# Patient Record
Sex: MALE | Race: BLACK OR AFRICAN AMERICAN | NOT HISPANIC OR LATINO | Employment: FULL TIME | URBAN - METROPOLITAN AREA
[De-identification: names, ages, dates, MRNs, and addresses within clinical notes are randomized per-mention and may not be internally consistent; named-entity substitution may affect disease eponyms.]

---

## 2023-10-11 ENCOUNTER — APPOINTMENT (EMERGENCY)
Dept: CT IMAGING | Facility: HOSPITAL | Age: 23
End: 2023-10-11
Payer: COMMERCIAL

## 2023-10-11 ENCOUNTER — HOSPITAL ENCOUNTER (EMERGENCY)
Facility: HOSPITAL | Age: 23
Discharge: HOME | End: 2023-10-11
Attending: EMERGENCY MEDICINE | Admitting: EMERGENCY MEDICINE
Payer: COMMERCIAL

## 2023-10-11 VITALS
OXYGEN SATURATION: 99 % | SYSTOLIC BLOOD PRESSURE: 139 MMHG | WEIGHT: 209.22 LBS | HEIGHT: 72 IN | RESPIRATION RATE: 18 BRPM | HEART RATE: 89 BPM | TEMPERATURE: 98 F | BODY MASS INDEX: 28.34 KG/M2 | DIASTOLIC BLOOD PRESSURE: 78 MMHG

## 2023-10-11 DIAGNOSIS — S39.012A ACUTE MYOFASCIAL STRAIN OF LUMBAR REGION, INITIAL ENCOUNTER: Primary | ICD-10-CM

## 2023-10-11 DIAGNOSIS — E87.6 HYPOKALEMIA: ICD-10-CM

## 2023-10-11 LAB
ALBUMIN SERPL BCP-MCNC: 4.4 G/DL (ref 3.5–5)
ALP SERPL-CCNC: 49 U/L (ref 34–104)
ALT SERPL W P-5'-P-CCNC: 11 U/L (ref 7–52)
ANION GAP SERPL CALCULATED.3IONS-SCNC: 6 MMOL/L
AST SERPL W P-5'-P-CCNC: 15 U/L (ref 13–39)
BASOPHILS # BLD AUTO: 0.02 THOUSANDS/ÂΜL (ref 0–0.1)
BASOPHILS NFR BLD AUTO: 1 % (ref 0–1)
BILIRUB SERPL-MCNC: 0.86 MG/DL (ref 0.2–1)
BILIRUB UR QL STRIP: NEGATIVE
BUN SERPL-MCNC: 16 MG/DL (ref 5–25)
CALCIUM SERPL-MCNC: 9.1 MG/DL (ref 8.4–10.2)
CHLORIDE SERPL-SCNC: 104 MMOL/L (ref 96–108)
CLARITY UR: CLEAR
CO2 SERPL-SCNC: 27 MMOL/L (ref 21–32)
COLOR UR: COLORLESS
CREAT SERPL-MCNC: 0.98 MG/DL (ref 0.6–1.3)
EOSINOPHIL # BLD AUTO: 0.04 THOUSAND/ÂΜL (ref 0–0.61)
EOSINOPHIL NFR BLD AUTO: 1 % (ref 0–6)
ERYTHROCYTE [DISTWIDTH] IN BLOOD BY AUTOMATED COUNT: 11.9 % (ref 11.6–15.1)
GFR SERPL CREATININE-BSD FRML MDRD: 108 ML/MIN/1.73SQ M
GLUCOSE SERPL-MCNC: 115 MG/DL (ref 65–140)
GLUCOSE UR STRIP-MCNC: NEGATIVE MG/DL
HCT VFR BLD AUTO: 38.2 % (ref 36.5–49.3)
HGB BLD-MCNC: 12.1 G/DL (ref 12–17)
HGB UR QL STRIP.AUTO: NEGATIVE
IMM GRANULOCYTES # BLD AUTO: 0.01 THOUSAND/UL (ref 0–0.2)
IMM GRANULOCYTES NFR BLD AUTO: 0 % (ref 0–2)
KETONES UR STRIP-MCNC: NEGATIVE MG/DL
LEUKOCYTE ESTERASE UR QL STRIP: NEGATIVE
LYMPHOCYTES # BLD AUTO: 0.96 THOUSANDS/ÂΜL (ref 0.6–4.47)
LYMPHOCYTES NFR BLD AUTO: 27 % (ref 14–44)
MCH RBC QN AUTO: 27.7 PG (ref 26.8–34.3)
MCHC RBC AUTO-ENTMCNC: 31.7 G/DL (ref 31.4–37.4)
MCV RBC AUTO: 87 FL (ref 82–98)
MONOCYTES # BLD AUTO: 0.24 THOUSAND/ÂΜL (ref 0.17–1.22)
MONOCYTES NFR BLD AUTO: 7 % (ref 4–12)
NEUTROPHILS # BLD AUTO: 2.26 THOUSANDS/ÂΜL (ref 1.85–7.62)
NEUTS SEG NFR BLD AUTO: 64 % (ref 43–75)
NITRITE UR QL STRIP: NEGATIVE
NRBC BLD AUTO-RTO: 0 /100 WBCS
PH UR STRIP.AUTO: 6 [PH]
PLATELET # BLD AUTO: 179 THOUSANDS/UL (ref 149–390)
PMV BLD AUTO: 9.4 FL (ref 8.9–12.7)
POTASSIUM SERPL-SCNC: 3.3 MMOL/L (ref 3.5–5.3)
PROT SERPL-MCNC: 6.7 G/DL (ref 6.4–8.4)
PROT UR STRIP-MCNC: NEGATIVE MG/DL
RBC # BLD AUTO: 4.37 MILLION/UL (ref 3.88–5.62)
SODIUM SERPL-SCNC: 137 MMOL/L (ref 135–147)
SP GR UR STRIP.AUTO: 1.01 (ref 1–1.03)
UROBILINOGEN UR STRIP-ACNC: <2 MG/DL
WBC # BLD AUTO: 3.53 THOUSAND/UL (ref 4.31–10.16)

## 2023-10-11 PROCEDURE — 81003 URINALYSIS AUTO W/O SCOPE: CPT | Performed by: EMERGENCY MEDICINE

## 2023-10-11 PROCEDURE — 80053 COMPREHEN METABOLIC PANEL: CPT | Performed by: EMERGENCY MEDICINE

## 2023-10-11 PROCEDURE — 99284 EMERGENCY DEPT VISIT MOD MDM: CPT | Performed by: EMERGENCY MEDICINE

## 2023-10-11 PROCEDURE — G1004 CDSM NDSC: HCPCS

## 2023-10-11 PROCEDURE — 74176 CT ABD & PELVIS W/O CONTRAST: CPT

## 2023-10-11 PROCEDURE — 36415 COLL VENOUS BLD VENIPUNCTURE: CPT | Performed by: EMERGENCY MEDICINE

## 2023-10-11 PROCEDURE — 99284 EMERGENCY DEPT VISIT MOD MDM: CPT

## 2023-10-11 PROCEDURE — 85025 COMPLETE CBC W/AUTO DIFF WBC: CPT | Performed by: EMERGENCY MEDICINE

## 2023-10-11 PROCEDURE — 96374 THER/PROPH/DIAG INJ IV PUSH: CPT

## 2023-10-11 RX ORDER — KETOROLAC TROMETHAMINE 30 MG/ML
15 INJECTION, SOLUTION INTRAMUSCULAR; INTRAVENOUS ONCE
Status: COMPLETED | OUTPATIENT
Start: 2023-10-11 | End: 2023-10-11

## 2023-10-11 RX ORDER — IBUPROFEN 600 MG/1
600 TABLET ORAL EVERY 8 HOURS PRN
Qty: 20 TABLET | Refills: 0 | Status: SHIPPED | OUTPATIENT
Start: 2023-10-11

## 2023-10-11 RX ORDER — CYCLOBENZAPRINE HCL 10 MG
10 TABLET ORAL
Qty: 7 TABLET | Refills: 0 | Status: SHIPPED | OUTPATIENT
Start: 2023-10-11

## 2023-10-11 RX ORDER — POTASSIUM CHLORIDE 20 MEQ/1
40 TABLET, EXTENDED RELEASE ORAL ONCE
Status: COMPLETED | OUTPATIENT
Start: 2023-10-11 | End: 2023-10-11

## 2023-10-11 RX ADMIN — KETOROLAC TROMETHAMINE 15 MG: 30 INJECTION INTRAMUSCULAR; INTRAVENOUS at 14:36

## 2023-10-11 RX ADMIN — POTASSIUM CHLORIDE 40 MEQ: 1500 TABLET, EXTENDED RELEASE ORAL at 15:27

## 2023-10-11 NOTE — DISCHARGE INSTRUCTIONS
Take ibuprofen 600 mg orally every 8 hours as needed for back discomfort. You may additionally take cyclobenzaprine 10 mg at night to help with muscle spasm. Heat may be applied for 15 to 20-minute intervals to ease soreness.

## 2023-10-11 NOTE — ED PROVIDER NOTES
History  Chief Complaint   Patient presents with    Flank Pain     Right sided flank pain that started Saturday. Denies n/v/fever. Denies difficulty urinating. Reports pain intermittently radiates to abdomen     Patient is a 60-year-old male who presents to the emergency department for evaluation with right flank pain. Onset a few days ago. He notes that this was mild initially. It has been fairly continuous however with radiation anteriorly. Pain is worst with movement such as walking. He does feel better when still. He has not appreciated any fevers, nausea, vomiting, change in appetite or changes or abnormalities in urination or defecation. Acetaminophen has helped the pain slightly. He is a  by [employer] and normally does not complain of discomfort. He is accustomed to working in unusual positions and does not recall having done anything out of the ordinary. His wife does accompany him to the ED and notes that he never complains of anything. He is healthy at baseline and without any known history of kidney stones. None       History reviewed. No pertinent past medical history. History reviewed. No pertinent surgical history. History reviewed. No pertinent family history. I have reviewed and agree with the history as documented. E-Cigarette/Vaping     E-Cigarette/Vaping Substances     Social History     Tobacco Use    Smoking status: Never    Smokeless tobacco: Never   Substance Use Topics    Alcohol use: Not Currently    Drug use: Never       Review of Systems   Genitourinary:  Negative for decreased urine volume, dysuria, frequency, hematuria and urgency. All other systems reviewed and are negative. Physical Exam  Physical Exam  Vitals and nursing note reviewed. Constitutional:       Appearance: Normal appearance. HENT:      Head: Normocephalic. Nose: Nose normal.   Eyes:      Extraocular Movements: Extraocular movements intact.       Conjunctiva/sclera: Conjunctivae normal.   Cardiovascular:      Rate and Rhythm: Normal rate and regular rhythm. Pulmonary:      Effort: Pulmonary effort is normal.      Breath sounds: Normal breath sounds. Abdominal:      General: Bowel sounds are normal. There is no distension. Palpations: Abdomen is soft. Tenderness: There is no abdominal tenderness. There is no right CVA tenderness, left CVA tenderness or guarding. Musculoskeletal:         General: Normal range of motion. Comments: No midline or paraspinal low thoracic, lumbar or sacral tenderness. Overlying skin is clear. Skin:     General: Skin is warm and dry. Findings: No rash. Neurological:      Mental Status: He is alert and oriented to person, place, and time.    Psychiatric:         Mood and Affect: Mood normal.         Behavior: Behavior normal.         Vital Signs  ED Triage Vitals [10/11/23 1351]   Temperature Pulse Respirations Blood Pressure SpO2   98 °F (36.7 °C) 89 18 139/78 99 %      Temp Source Heart Rate Source Patient Position - Orthostatic VS BP Location FiO2 (%)   Oral Monitor Sitting Right arm --      Pain Score       6           Vitals:    10/11/23 1351   BP: 139/78   Pulse: 89   Patient Position - Orthostatic VS: Sitting         Visual Acuity      ED Medications  Medications   ketorolac (TORADOL) injection 15 mg (15 mg Intravenous Given 10/11/23 1436)   potassium chloride (K-DUR,KLOR-CON) CR tablet 40 mEq (40 mEq Oral Given 10/11/23 1527)       Diagnostic Studies  Results Reviewed       Procedure Component Value Units Date/Time    UA (URINE) with reflex to Scope [[id number]] Collected: 10/11/23 1438    Lab Status: Final result Specimen: Urine, Clean Catch Updated: 10/11/23 1506     Color, UA Colorless     Clarity, UA Clear     Specific Gravity, UA 1.015     pH, UA 6.0     Leukocytes, UA Negative     Nitrite, UA Negative     Protein, UA Negative mg/dl      Glucose, UA Negative mg/dl      Ketones, UA Negative mg/dl      Urobilinogen, UA <2.0 mg/dl      Bilirubin, UA Negative     Occult Blood, UA Negative    Comprehensive metabolic panel [[id number]]  (Abnormal) Collected: 10/11/23 1417    Lab Status: Final result Specimen: Blood from Arm, Left Updated: 10/11/23 1442     Sodium 137 mmol/L      Potassium 3.3 mmol/L      Chloride 104 mmol/L      CO2 27 mmol/L      ANION GAP 6 mmol/L      BUN 16 mg/dL      Creatinine 0.98 mg/dL      Glucose 115 mg/dL      Calcium 9.1 mg/dL      AST 15 U/L      ALT 11 U/L      Alkaline Phosphatase 49 U/L      Total Protein 6.7 g/dL      Albumin 4.4 g/dL      Total Bilirubin 0.86 mg/dL      eGFR 108 ml/min/1.73sq m     Narrative:      National Kidney Disease Foundation guidelines for Chronic Kidney Disease (CKD):     Stage 1 with normal or high GFR (GFR > 90 mL/min/1.73 square meters)    Stage 2 Mild CKD (GFR = 60-89 mL/min/1.73 square meters)    Stage 3A Moderate CKD (GFR = 45-59 mL/min/1.73 square meters)    Stage 3B Moderate CKD (GFR = 30-44 mL/min/1.73 square meters)    Stage 4 Severe CKD (GFR = 15-29 mL/min/1.73 square meters)    Stage 5 End Stage CKD (GFR <15 mL/min/1.73 square meters)  Note: GFR calculation is accurate only with a steady state creatinine    CBC and differential [[id number]]  (Abnormal) Collected: 10/11/23 1417    Lab Status: Final result Specimen: Blood from Arm, Left Updated: 10/11/23 1429     WBC 3.53 Thousand/uL      RBC 4.37 Million/uL      Hemoglobin 12.1 g/dL      Hematocrit 38.2 %      MCV 87 fL      MCH 27.7 pg      MCHC 31.7 g/dL      RDW 11.9 %      MPV 9.4 fL      Platelets 813 Thousands/uL      nRBC 0 /100 WBCs      Neutrophils Relative 64 %      Immat GRANS % 0 %      Lymphocytes Relative 27 %      Monocytes Relative 7 %      Eosinophils Relative 1 %      Basophils Relative 1 %      Neutrophils Absolute 2.26 Thousands/µL      Immature Grans Absolute 0.01 Thousand/uL      Lymphocytes Absolute 0.96 Thousands/µL      Monocytes Absolute 0.24 Thousand/µL      Eosinophils Absolute 0.04 Thousand/µL Basophils Absolute 0.02 Thousands/µL                    CT renal stone study abdomen pelvis without contrast   Final Result by Nataly Tan MD (10/11 1438)      No urinary tract calculi, hydronephrosis or additional acute abnormality in the abdomen or pelvis. Workstation performed: [id number]                    Procedures  Procedures         ED Course  ED Course as of 10/11/23 1534   Wed Oct 11, 2023   1403 Differential diagnosis includes but is not limited to muscle strain, lumbar radiculopathy, muscle spasm, ureterolithiasis, doubt pyelonephritis or bowel pathology. 1444 CMP reveals normal renal function/GFR. Potassium is slightly low. Will give dose of oral potassium for repletion. CBC unremarkable. CT does not reveal structural abnormality including ureterolithiasis or hydronephrosis. Urinalysis has been collected. Results pending. 1518 Patient appreciates improvement in discomfort at this time. I reviewed study results with him and his wife. Do suspect musculoskeletal etiology. Discussed use of anti-inflammatory +/- muscle relaxer. He is interested in having something to use in the evening time when he has difficulty sleeping. Discussed comprehensive spine program.  Patient declines at this time and is interested in following up with his PCP. Discussed repletion of potassium and that this low level was noncontributory to his symptoms. Medical Decision Making  Problems Addressed:  Acute myofascial strain of lumbar region, initial encounter: acute illness or injury    Amount and/or Complexity of Data Reviewed  Labs: ordered. Decision-making details documented in ED Course. Radiology: ordered. Risk  OTC drugs. Prescription drug management.              Disposition  Final diagnoses:   Acute myofascial strain of lumbar region, initial encounter   Hypokalemia     Time reflects when diagnosis was documented in both MDM as applicable and the Disposition within this note       Time User Action Codes Description Comment    10/11/2023  3:21 PM Wade SPRAGUE Add [S39.012A] Acute myofascial strain of lumbar region, initial encounter     10/11/2023  3:23 PM Wade SPRAGUE Add [E87.6] Hypokalemia           ED Disposition       ED Disposition   Discharge    Condition   Stable    Date/Time   Wed Oct 11, 2023  3:21 PM    Robina Young [facility] discharge to home/self care. Follow-up Information       Follow up With Specialties Details Why Contact Info    Your primary care physician                Discharge Medication List as of 10/11/2023  3:24 PM        START taking these medications    Details   cyclobenzaprine (FLEXERIL) 10 mg tablet Take 1 tablet (10 mg total) by mouth daily at bedtime as needed for muscle spasms, Starting Wed 10/11/2023, Normal      ibuprofen (MOTRIN) 600 mg tablet Take 1 tablet (600 mg total) by mouth every 8 (eight) hours as needed for moderate pain, Starting Wed 10/11/2023, Normal             No discharge procedures on file.     PDMP Review       None            ED Provider  Electronically Signed by             Db Beltran MD  10/11/23 7418

## 2024-05-30 ENCOUNTER — HOSPITAL ENCOUNTER (OUTPATIENT)
Dept: RADIOLOGY | Facility: HOSPITAL | Age: 24
End: 2024-05-30
Payer: COMMERCIAL

## 2024-05-30 ENCOUNTER — OFFICE VISIT (OUTPATIENT)
Dept: OBGYN CLINIC | Facility: CLINIC | Age: 24
End: 2024-05-30
Payer: COMMERCIAL

## 2024-05-30 VITALS — HEART RATE: 89 BPM | HEIGHT: 72 IN | OXYGEN SATURATION: 99 % | BODY MASS INDEX: 26.68 KG/M2 | WEIGHT: 197 LBS

## 2024-05-30 DIAGNOSIS — M25.511 RIGHT SHOULDER PAIN, UNSPECIFIED CHRONICITY: ICD-10-CM

## 2024-05-30 DIAGNOSIS — M25.511 RIGHT SHOULDER PAIN, UNSPECIFIED CHRONICITY: Primary | ICD-10-CM

## 2024-05-30 DIAGNOSIS — M25.311 INSTABILITY OF RIGHT SHOULDER JOINT: ICD-10-CM

## 2024-05-30 PROCEDURE — 73030 X-RAY EXAM OF SHOULDER: CPT

## 2024-05-30 PROCEDURE — 99204 OFFICE O/P NEW MOD 45 MIN: CPT | Performed by: PHYSICIAN ASSISTANT

## 2024-05-30 NOTE — PROGRESS NOTES
Assessment & Plan   Diagnoses and all orders for this visit:    Right shoulder pain, unspecified chronicity    Instability of right shoulder joint    Recurrent dislocations, ?inferior    - MRI arthrogram  - Ice as needed  - Avoid the positions that caused dislocations in the past  - Follow up with Dr. Floyd          Subjective   Patient ID: Destin Melo is a 24 y.o. male.    Vitals:    05/30/24 1629   Pulse: 89   SpO2: 99%     .  23yo male comes in for an evaluation of his right shoulder.  He has a history of numerous subluxations and can do this at will.  He had his first full dislocation when throwing a tennis ball a few months ago.  He reports his arm was stuck in a fully extended, [place] position and it was eventually reduced by his wife.  Then, on 5/25 he dove into a pool with his arms up in a dive position and the shoulder dislocated again.  He and his wife were able to reduce it by pulling on it. A few minutes later, he was swimming underwater and it dislocated again.  This time, they had much more difficulty reducing it.  The pain is dull in character, mild in severity, does not radiate and is not associated with numbness.  He has never been diagnosed with hypermobility or Nicholas-Danlos, but states he is double jointed.  He is able to dislocate the left shoulder at will also.        The following portions of the patient's history were reviewed and updated as appropriate: allergies, current medications, past family history, past medical history, past social history, past surgical history, and problem list.    Review of Systems  Ortho Exam  History reviewed. No pertinent past medical history.  History reviewed. No pertinent surgical history.  History reviewed. No pertinent family history.  Social History     Occupational History    Not on file   Tobacco Use    Smoking status: Never    Smokeless tobacco: Never   Substance and Sexual Activity    Alcohol use: Not Currently    Drug use: Never     Sexual activity: Not on file       Review of Systems   Constitutional: Negative.    HENT: Negative.    Eyes: Negative.    Respiratory: Negative.    Cardiovascular: Negative.    Gastrointestinal: Negative.    Endocrine: Negative.    Genitourinary: Negative.    Musculoskeletal: As below..   Allergic/Immunologic: Negative.    Neurological: Negative.    Hematological: Negative.    Psychiatric/Behavioral: Negative.        Objective   Physical Exam    Xrays:  I have personally reviewed pertinent films in PACS and my interpretation is No acute displaced fracture. No hill sachs..    Constitutional: Awake, Alert, Oriented  Eyes: EOMI  Psych: Mood and affect appropriate  Heart: regular rate   Lungs: No audible wheezing  Abdomen: No guarding  Lymph: no lymphedema      right Shoulder:  Appearance  No swelling, discoloration, deformity, or ecchymosis  Palpation  No tenderness to palpation of the clavicle, AC joint, biceps tendon, scapula, or proximal humerus  Mild GH tenderness  ROM  Flexion: 160, ER: 80, and IR: L2.  Pain at the ends of range in all planes  Motor  Internal and external rotation 5/5 with shoulder at side, flexion 5/5  Special tests  + apprehension test  NVI distally

## 2024-06-10 NOTE — NURSING NOTE
Call placed to patient to discuss upcoming appointment at [facility] radiology department and complete consultation with patient. Patient is having a right shoulder MRI arthrogram utilizing fluoroscopy guidance. Reviewed patient's allergies, no current anticoagulant medication present per patient, also discussed the pre and post procedure expectations. Patient made aware of need for  post procedure if anti anxiety medication is taken. Reminded patient of location and time expected for procedure, Patient expressed understanding by verbalizing and repeating instructions. Patient requested information to be e-mail to him due to his being at work. Sent information to [email or web address], see message below.   Good morning Mr. Melo,     We are located at [address] [place] [address]. Your appointment is scheduled for 6/17/24 @ 2 pm you are scheduled to have a right shoulder MRI arthrogram . You will need to enter [address], come up to the 1st floor and locate the Radiology department. Registration is in the Radiology department, please arrive 15 minutes prior so you may go through the registration process. You will have your Fluoro guided procedure then be guided to our MRI suite for your images @ 3 pm. For this test there are no restrictions, you may eat, drink, take all your usual medications and drive yourself to and from the appointment if you are not taking any medication for anti-anxiety for the procedure. If you are taking medications for anxiety for the procedure you will need a .      If you have any question or concerns, please feel free to contact me at the number below,   Marlin Bond RN  [facility] Radiology RN  [address] [place] [id number] [phone] (Office)  [phone] (Fax)  [email or web address]

## 2024-06-17 ENCOUNTER — HOSPITAL ENCOUNTER (OUTPATIENT)
Dept: RADIOLOGY | Facility: HOSPITAL | Age: 24
Discharge: HOME | End: 2024-06-17
Payer: COMMERCIAL

## 2024-06-17 DIAGNOSIS — M25.511 RIGHT SHOULDER PAIN, UNSPECIFIED CHRONICITY: ICD-10-CM

## 2024-06-17 DIAGNOSIS — S00.259A METAL FOREIGN BODY IN EYE REGION: ICD-10-CM

## 2024-06-17 DIAGNOSIS — M25.311 INSTABILITY OF RIGHT SHOULDER JOINT: ICD-10-CM

## 2024-06-17 PROCEDURE — A9585 GADOBUTROL INJECTION: HCPCS | Performed by: PHYSICIAN ASSISTANT

## 2024-06-17 PROCEDURE — 73222 MRI JOINT UPR EXTREM W/DYE: CPT

## 2024-06-17 PROCEDURE — 77002 NEEDLE LOCALIZATION BY XRAY: CPT

## 2024-06-17 PROCEDURE — 23350 INJECTION FOR SHOULDER X-RAY: CPT

## 2024-06-17 RX ORDER — LIDOCAINE HYDROCHLORIDE 10 MG/ML
5 INJECTION, SOLUTION EPIDURAL; INFILTRATION; INTRACAUDAL; PERINEURAL
Status: COMPLETED | OUTPATIENT
Start: 2024-06-17 | End: 2024-06-17

## 2024-06-17 RX ORDER — GADOBUTROL 604.72 MG/ML
2 INJECTION INTRAVENOUS
Status: COMPLETED | OUTPATIENT
Start: 2024-06-17 | End: 2024-06-17

## 2024-06-17 RX ADMIN — GADOBUTROL 0.2 ML: 604.72 INJECTION INTRAVENOUS at 15:41

## 2024-06-17 RX ADMIN — LIDOCAINE HYDROCHLORIDE 4 ML: 10 INJECTION, SOLUTION EPIDURAL; INFILTRATION; INTRACAUDAL; PERINEURAL at 15:44

## 2024-06-17 RX ADMIN — IOHEXOL 1 ML: 300 INJECTION, SOLUTION INTRAVENOUS at 15:22

## 2024-07-03 ENCOUNTER — OFFICE VISIT (OUTPATIENT)
Dept: OBGYN CLINIC | Facility: CLINIC | Age: 24
End: 2024-07-03
Payer: COMMERCIAL

## 2024-07-03 VITALS
BODY MASS INDEX: 27.28 KG/M2 | HEIGHT: 72 IN | DIASTOLIC BLOOD PRESSURE: 72 MMHG | WEIGHT: 201.4 LBS | SYSTOLIC BLOOD PRESSURE: 136 MMHG | HEART RATE: 63 BPM

## 2024-07-03 DIAGNOSIS — M25.311 INSTABILITY OF RIGHT SHOULDER JOINT: ICD-10-CM

## 2024-07-03 DIAGNOSIS — S43.004A DISLOCATION OF RIGHT SHOULDER JOINT, INITIAL ENCOUNTER: Primary | ICD-10-CM

## 2024-07-03 PROCEDURE — 99214 OFFICE O/P EST MOD 30 MIN: CPT | Performed by: ORTHOPAEDIC SURGERY

## 2024-07-03 RX ORDER — CHLORHEXIDINE GLUCONATE 40 MG/ML
SOLUTION TOPICAL DAILY PRN
OUTPATIENT
Start: 2024-07-03

## 2024-07-03 RX ORDER — CHLORHEXIDINE GLUCONATE ORAL RINSE 1.2 MG/ML
15 SOLUTION DENTAL ONCE
OUTPATIENT
Start: 2024-07-03 | End: 2024-07-03

## 2024-07-03 NOTE — H&P (VIEW-ONLY)
[facility] [address] [id number] [phone]       Destin Sims [facility]  [id number]  2000    ORTHOPAEDIC SURGERY OUTPATIENT NOTE  7/3/2024      HISTORY:  24 y.o. male presents for initial evaluation of his right shoulder.  He reports in May of this year he was playing tennis and swinging overhead with a racquet when he felt his shoulder dislocate.  He had 2 subsequent dislocations while swimming with reaching overhead and diving position.  He did self reduce this shoulder.  He reports a history of bilateral shoulders being able to sublux but not fully dislocate.  He is never had a traumatic dislocation before.  He reports his pain is improved in the shoulder since the initial injury but he continues with feeling of instability when he reaches overhead.  He denies any pain in the left shoulder.  He has no other hypermobile joints are laxity.    History reviewed. No pertinent past medical history.    Past Surgical History:   Procedure Laterality Date    FL INJECTION RIGHT SHOULDER (ARTHROGRAM)  6/17/2024       Social History     Socioeconomic History    Marital status: /Civil Union     Spouse name: Not on file    Number of children: Not on file    Years of education: Not on file    Highest education level: Not on file   Occupational History    Not on file   Tobacco Use    Smoking status: Never    Smokeless tobacco: Never   Substance and Sexual Activity    Alcohol use: Not Currently    Drug use: Never    Sexual activity: Not on file   Other Topics Concern    Not on file   Social History Narrative    Not on file     Social Determinants of Health     Financial Resource Strain: Not on file   Food Insecurity: Not on file   Transportation Needs: Not on file   Physical Activity: Not on file   Stress: Not on file   Social Connections: Unknown (6/18/2024)    Received from [email or web address]     How often  do you feel lonely or isolated from those around you? (Adult - for ages 18 years and over): Not on file   Intimate Partner Violence: Not on file   Housing Stability: Not on file       History reviewed. No pertinent family history.     Patient's Medications   New Prescriptions    No medications on file   Previous Medications    CYCLOBENZAPRINE (FLEXERIL) 10 MG TABLET    Take 1 tablet (10 mg total) by mouth daily at bedtime as needed for muscle spasms    IBUPROFEN (MOTRIN) 600 MG TABLET    Take 1 tablet (600 mg total) by mouth every 8 (eight) hours as needed for moderate pain   Modified Medications    No medications on file   Discontinued Medications    No medications on file       No Known Allergies     /72 (BP Location: Left arm, Patient Position: Sitting, Cuff Size: Standard)   Pulse 63   Ht 6' (1.829 m)   Wt 91.4 kg (201 lb 6.4 oz)   BMI 27.31 kg/m²      REVIEW OF SYSTEMS:  Constitutional: Negative.    HEENT: Negative.    Respiratory: Negative.    Skin: Negative.    Neurological: Negative.    Psychiatric/Behavioral: Negative.  Musculoskeletal: Negative except for that mentioned in the HPI.    /72 (BP Location: Left arm, Patient Position: Sitting, Cuff Size: Standard)   Pulse 63   Ht 6' (1.829 m)   Wt 91.4 kg (201 lb 6.4 oz)   BMI 27.31 kg/m²   Gen: No acute distress, resting comfortably in bed  HEENT: Eyes clear, moist mucus membranes, hearing intact  Respiratory: No audible wheezing or stridor  Cardiovascular: Well Perfused peripherally, 2+ distal pulse  Abdomen: nondistended, no peritoneal signs     PHYSICAL EXAM:    RIGHT SHOULDER:    Appearance: normal    Forward flexion:   180 degrees   Abduction:  90 degrees   External rotation at 90 degrees abduction:   90 degrees   Internal rotation at 90 degrees abduction:  90 degrees   External rotation at 0 degrees:   70 degrees   Internal rotation: T7     STRENGTH:  Forward flexion:  5/5   Abduction:  5/5   External rotation:  5/5   Internal  rotation:  5/5        Speed test: Negative  Yergason's: Negative   Tender to palpation ACJ (acromioclavicular joint): Negative   Tender to palpation LHB (long head of biceps): Negative  Krishna test: Negative  [place] test: Negative  Hornblower's: Negative  Lift off: Negative  Belly press: Negative  Bear hug: Negative  External lag sign: Negative  Cross-body adduction: Negative  Sulcus sign: Negative  Arcenio's test: Negative  Drop arm test: negative  Apprehension: positive    Radial/median/ulnar nerve intact    <2 sec cap refill       IMAGING: MRI arthrogram of the shoulder was reviewed.  This demonstrates anterior labral tear, Hill-Sachs lesion with marrow edema    ASSESSMENT AND PLAN:  24 y.o. male with right shoulder dislocation and anterior glenoid labral tear and Hill-Sachs lesion.  Discussed with the patient his imaging and exam findings.  This case was also discussed with Dr. Floyd.  Given the patient's instability and significant findings on MRI recommend surgical invention in the form of right shoulder arthroscopy with labral repair and possible remplissage.  Informed consent was signed.  We also ordered a CT scan preoperatively to evaluate for bony defect.    The patient understands the risks and benefits of the procedure with risks including pain, stiffness, infection, neurovascular injury, recurrence of symptoms, failure of surgical procedure, inadvertent intraoperative complications, blood loss, blood clots, allergic reaction to anesthesia, stroke, heart attack, all up to and including to death. The patient understood and did consent for surgery today.

## 2024-07-03 NOTE — PROGRESS NOTES
[facility]TH[facility]ARE [facility] [address] [id number] [phone]       Destin Sims [facility]  [id number]  2000    ORTHOPAEDIC SURGERY OUTPATIENT NOTE  7/3/2024      HISTORY:  24 y.o. male presents for initial evaluation of his right shoulder.  He reports in May of this year he was playing tennis and swinging overhead with a racquet when he felt his shoulder dislocate.  He had 2 subsequent dislocations while swimming with reaching overhead and diving position.  He did self reduce this shoulder.  He reports a history of bilateral shoulders being able to sublux but not fully dislocate.  He is never had a traumatic dislocation before.  He reports his pain is improved in the shoulder since the initial injury but he continues with feeling of instability when he reaches overhead.  He denies any pain in the left shoulder.  He has no other hypermobile joints are laxity.    History reviewed. No pertinent past medical history.    Past Surgical History:   Procedure Laterality Date    FL INJECTION RIGHT SHOULDER (ARTHROGRAM)  6/17/2024       Social History     Socioeconomic History    Marital status: /Civil Union     Spouse name: Not on file    Number of children: Not on file    Years of education: Not on file    Highest education level: Not on file   Occupational History    Not on file   Tobacco Use    Smoking status: Never    Smokeless tobacco: Never   Substance and Sexual Activity    Alcohol use: Not Currently    Drug use: Never    Sexual activity: Not on file   Other Topics Concern    Not on file   Social History Narrative    Not on file     Social Determinants of Health     Financial Resource Strain: Not on file   Food Insecurity: Not on file   Transportation Needs: Not on file   Physical Activity: Not on file   Stress: Not on file   Social Connections: Unknown (6/18/2024)    Received from [employer]     How often  do you feel lonely or isolated from those around you? (Adult - for ages 18 years and over): Not on file   Intimate Partner Violence: Not on file   Housing Stability: Not on file       History reviewed. No pertinent family history.     Patient's Medications   New Prescriptions    No medications on file   Previous Medications    CYCLOBENZAPRINE (FLEXERIL) 10 MG TABLET    Take 1 tablet (10 mg total) by mouth daily at bedtime as needed for muscle spasms    IBUPROFEN (MOTRIN) 600 MG TABLET    Take 1 tablet (600 mg total) by mouth every 8 (eight) hours as needed for moderate pain   Modified Medications    No medications on file   Discontinued Medications    No medications on file       No Known Allergies     /72 (BP Location: Left arm, Patient Position: Sitting, Cuff Size: Standard)   Pulse 63   Ht 6' (1.829 m)   Wt 91.4 kg (201 lb 6.4 oz)   BMI 27.31 kg/m²      REVIEW OF SYSTEMS:  Constitutional: Negative.    HEENT: Negative.    Respiratory: Negative.    Skin: Negative.    Neurological: Negative.    Psychiatric/Behavioral: Negative.  Musculoskeletal: Negative except for that mentioned in the HPI.    /72 (BP Location: Left arm, Patient Position: Sitting, Cuff Size: Standard)   Pulse 63   Ht 6' (1.829 m)   Wt 91.4 kg (201 lb 6.4 oz)   BMI 27.31 kg/m²   Gen: No acute distress, resting comfortably in bed  HEENT: Eyes clear, moist mucus membranes, hearing intact  Respiratory: No audible wheezing or stridor  Cardiovascular: Well Perfused peripherally, 2+ distal pulse  Abdomen: nondistended, no peritoneal signs     PHYSICAL EXAM:    RIGHT SHOULDER:    Appearance: normal    Forward flexion:   180 degrees   Abduction:  90 degrees   External rotation at 90 degrees abduction:   90 degrees   Internal rotation at 90 degrees abduction:  90 degrees   External rotation at 0 degrees:   70 degrees   Internal rotation: T7     STRENGTH:  Forward flexion:  5/5   Abduction:  5/5   External rotation:  5/5   Internal  rotation:  5/5        Speed test: Negative  Yergason's: Negative   Tender to palpation ACJ (acromioclavicular joint): Negative   Tender to palpation LHB (long head of biceps): Negative  Krishna test: Negative  [place] test: Negative  Hornblower's: Negative  Lift off: Negative  Belly press: Negative  Bear hug: Negative  External lag sign: Negative  Cross-body adduction: Negative  Sulcus sign: Negative  Arcenio's test: Negative  Drop arm test: negative  Apprehension: positive    Radial/median/ulnar nerve intact    <2 sec cap refill       IMAGING: MRI arthrogram of the shoulder was reviewed.  This demonstrates anterior labral tear, Hill-Sachs lesion with marrow edema    ASSESSMENT AND PLAN:  24 y.o. male with right shoulder dislocation and anterior glenoid labral tear and Hill-Sachs lesion.  Discussed with the patient his imaging and exam findings.  This case was also discussed with Dr. Floyd.  Given the patient's instability and significant findings on MRI recommend surgical invention in the form of right shoulder arthroscopy with labral repair and possible remplissage.  Informed consent was signed.  We also ordered a CT scan preoperatively to evaluate for bony defect.    The patient understands the risks and benefits of the procedure with risks including pain, stiffness, infection, neurovascular injury, recurrence of symptoms, failure of surgical procedure, inadvertent intraoperative complications, blood loss, blood clots, allergic reaction to anesthesia, stroke, heart attack, all up to and including to death. The patient understood and did consent for surgery today.

## 2024-07-03 NOTE — LETTER
July 3, 2024     Patient: Destin Melo  YOB: 2000  Date of Visit: 7/3/2024      To Whom it May Concern:    Destin Melo is under my professional care. Destin was seen in my office on 7/3/2024. Destin may return to work light duty, no lifting over 5lb no overhead work. He will be scheduled for surgery and will need to be out of work at least 6 weeks after surgery.     If you have any questions or concerns, please don't hesitate to call.         Sincerely,          Lianna Floyd DO        CC: No Recipients

## 2024-07-08 ENCOUNTER — TELEPHONE (OUTPATIENT)
Dept: OBGYN CLINIC | Facility: CLINIC | Age: 24
End: 2024-07-08

## 2024-07-08 ENCOUNTER — TELEPHONE (OUTPATIENT)
Dept: OBGYN CLINIC | Facility: HOSPITAL | Age: 24
End: 2024-07-08

## 2024-07-08 NOTE — TELEPHONE ENCOUNTER
Caller: Charlette     Doctor: Mariel    Reason for call: Wife calling and will send disability forms through [employer] for Destin. I walked her through on how to send it through [employer].   Advised takes 10-14 days to complete.     Just wanted to give a heads up.     Call back#: [phone]

## 2024-07-08 NOTE — TELEPHONE ENCOUNTER
Returned call to pt's wife per message received.  They're requesting to change his surgery date to after 7/21.  New surgery and p/o dates agreed upon.

## 2024-07-11 ENCOUNTER — HOSPITAL ENCOUNTER (OUTPATIENT)
Dept: CT IMAGING | Facility: HOSPITAL | Age: 24
Discharge: HOME | End: 2024-07-11
Payer: COMMERCIAL

## 2024-07-11 DIAGNOSIS — S43.004A DISLOCATION OF RIGHT SHOULDER JOINT, INITIAL ENCOUNTER: ICD-10-CM

## 2024-07-11 PROCEDURE — 73200 CT UPPER EXTREMITY W/O DYE: CPT

## 2024-07-15 NOTE — PRE-PROCEDURE INSTRUCTIONS
No outpatient medications have been marked as taking for the 7/25/24 encounter (Hospital Encounter).     Spoke with pt via phone.    Medication instructions for day surgery reviewed. Please use only a sip of water to take your instructed medications. Avoid all over the counter vitamins, supplements and NSAIDS for one week prior to surgery per anesthesia guidelines. Tylenol is ok to take as needed.     You will receive a call one business day prior to surgery with an arrival time and hospital directions. If your surgery is scheduled on a Monday, the hospital will be calling you on the Friday prior to your surgery. If you have not heard from anyone by 8pm, please call the hospital supervisor through the hospital  at [phone]. ([place] [phone] or [place] [phone]).    Do not eat or drink anything after midnight the night before your surgery, including candy, mints, lifesavers, or chewing gum. Do not drink alcohol 24hrs before your surgery. Try not to smoke at least 24hrs before your surgery.       Follow the pre surgery showering instructions as listed in the “My Surgical Experience Booklet” or otherwise provided by your surgeon's office. Do not use a blade to shave the surgical area 1 week before surgery. It is okay to use a clean electric clippers up to 24 hours before surgery. Do not apply any lotions, creams, including makeup, cologne, deodorant, or perfumes after showering on the day of your surgery. Do not use dry shampoo, hair spray, hair gel, or any type of hair products.     No contact lenses, eye make-up, or artificial eyelashes. Remove nail polish, including gel polish, and any artificial, gel, or acrylic nails if possible. Remove all jewelry including rings and body piercing jewelry.     Wear causal clothing that is easy to take on and off. Consider your type of surgery.    Keep any valuables, jewelry, piercings at home. Please bring any specially ordered equipment (sling, braces) if  indicated.    Arrange for a responsible person to drive you to and from the hospital on the day of your surgery. Please confirm the visitor policy for the day of your procedure when you receive your phone call with an arrival time.     Call the surgeon's office with any new illnesses, exposures, or additional questions prior to surgery.    Please reference your “My Surgical Experience Booklet” for additional information to prepare for your upcoming surgery.

## 2024-07-23 ENCOUNTER — TELEPHONE (OUTPATIENT)
Dept: OBGYN CLINIC | Facility: CLINIC | Age: 24
End: 2024-07-23

## 2024-07-23 NOTE — TELEPHONE ENCOUNTER
S/w pt to move his surgery from 7/25 to 7/26 per Dr. Floyd's request.  Pt verbalized understanding and agreed to the change.

## 2024-07-26 ENCOUNTER — ANESTHESIA EVENT (OUTPATIENT)
Dept: PERIOP | Facility: HOSPITAL | Age: 24
End: 2024-07-26
Payer: COMMERCIAL

## 2024-07-26 ENCOUNTER — HOSPITAL ENCOUNTER (OUTPATIENT)
Facility: HOSPITAL | Age: 24
Setting detail: OUTPATIENT SURGERY
Discharge: HOME | End: 2024-07-26
Attending: ORTHOPAEDIC SURGERY | Admitting: ORTHOPAEDIC SURGERY
Payer: COMMERCIAL

## 2024-07-26 ENCOUNTER — ANESTHESIA (OUTPATIENT)
Dept: PERIOP | Facility: HOSPITAL | Age: 24
End: 2024-07-26
Payer: COMMERCIAL

## 2024-07-26 VITALS
OXYGEN SATURATION: 98 % | TEMPERATURE: 97.5 F | HEART RATE: 68 BPM | SYSTOLIC BLOOD PRESSURE: 125 MMHG | RESPIRATION RATE: 16 BRPM | BODY MASS INDEX: 25.41 KG/M2 | HEIGHT: 72 IN | WEIGHT: 187.6 LBS | DIASTOLIC BLOOD PRESSURE: 68 MMHG

## 2024-07-26 DIAGNOSIS — S43.004A DISLOCATION OF RIGHT SHOULDER JOINT, INITIAL ENCOUNTER: Primary | ICD-10-CM

## 2024-07-26 PROCEDURE — 29806 SHO ARTHRS SRG CAPSULORRAPHY: CPT | Performed by: PHYSICIAN ASSISTANT

## 2024-07-26 PROCEDURE — C9290 INJ, BUPIVACAINE LIPOSOME: HCPCS | Performed by: STUDENT IN AN ORGANIZED HEALTH CARE EDUCATION/TRAINING PROGRAM

## 2024-07-26 PROCEDURE — 29806 SHO ARTHRS SRG CAPSULORRAPHY: CPT | Performed by: ORTHOPAEDIC SURGERY

## 2024-07-26 PROCEDURE — C1713 ANCHOR/SCREW BN/BN,TIS/BN: HCPCS | Performed by: ORTHOPAEDIC SURGERY

## 2024-07-26 DEVICE — SELF BUNCHING KL 1.8 FIBERTAK, SHOULDER
Type: IMPLANTABLE DEVICE | Site: SHOULDER | Status: FUNCTIONAL
Brand: ARTHREX®

## 2024-07-26 RX ORDER — SODIUM CHLORIDE, SODIUM LACTATE, POTASSIUM CHLORIDE, CALCIUM CHLORIDE 600; 310; 30; 20 MG/100ML; MG/100ML; MG/100ML; MG/100ML
125 INJECTION, SOLUTION INTRAVENOUS CONTINUOUS
Status: DISCONTINUED | OUTPATIENT
Start: 2024-07-26 | End: 2024-07-26 | Stop reason: HOSPADM

## 2024-07-26 RX ORDER — PENICILLIN V POTASSIUM 500 MG/1
500 TABLET ORAL EVERY 6 HOURS SCHEDULED
Qty: 8 TABLET | Refills: 0 | Status: SHIPPED | OUTPATIENT
Start: 2024-07-26 | End: 2024-07-28

## 2024-07-26 RX ORDER — FENTANYL CITRATE 50 UG/ML
INJECTION, SOLUTION INTRAMUSCULAR; INTRAVENOUS AS NEEDED
Status: DISCONTINUED | OUTPATIENT
Start: 2024-07-26 | End: 2024-07-26

## 2024-07-26 RX ORDER — CHLORHEXIDINE GLUCONATE ORAL RINSE 1.2 MG/ML
15 SOLUTION DENTAL ONCE
Status: COMPLETED | OUTPATIENT
Start: 2024-07-26 | End: 2024-07-26

## 2024-07-26 RX ORDER — ROCURONIUM BROMIDE 10 MG/ML
INJECTION, SOLUTION INTRAVENOUS AS NEEDED
Status: DISCONTINUED | OUTPATIENT
Start: 2024-07-26 | End: 2024-07-26

## 2024-07-26 RX ORDER — TRANEXAMIC ACID 10 MG/ML
1000 INJECTION, SOLUTION INTRAVENOUS ONCE
Status: COMPLETED | OUTPATIENT
Start: 2024-07-26 | End: 2024-07-26

## 2024-07-26 RX ORDER — FENTANYL CITRATE/PF 50 MCG/ML
50 SYRINGE (ML) INJECTION
Status: DISCONTINUED | OUTPATIENT
Start: 2024-07-26 | End: 2024-07-26 | Stop reason: HOSPADM

## 2024-07-26 RX ORDER — LIDOCAINE HYDROCHLORIDE 20 MG/ML
INJECTION, SOLUTION EPIDURAL; INFILTRATION; INTRACAUDAL; PERINEURAL AS NEEDED
Status: DISCONTINUED | OUTPATIENT
Start: 2024-07-26 | End: 2024-07-26

## 2024-07-26 RX ORDER — OXYCODONE HYDROCHLORIDE 5 MG/1
5 TABLET ORAL EVERY 4 HOURS PRN
Qty: 30 TABLET | Refills: 0 | Status: SHIPPED | OUTPATIENT
Start: 2024-07-26 | End: 2024-07-31

## 2024-07-26 RX ORDER — MIDAZOLAM HYDROCHLORIDE 2 MG/2ML
INJECTION, SOLUTION INTRAMUSCULAR; INTRAVENOUS
Status: COMPLETED | OUTPATIENT
Start: 2024-07-26 | End: 2024-07-26

## 2024-07-26 RX ORDER — DEXAMETHASONE SODIUM PHOSPHATE 10 MG/ML
INJECTION, SOLUTION INTRAMUSCULAR; INTRAVENOUS AS NEEDED
Status: DISCONTINUED | OUTPATIENT
Start: 2024-07-26 | End: 2024-07-26

## 2024-07-26 RX ORDER — ONDANSETRON 2 MG/ML
INJECTION INTRAMUSCULAR; INTRAVENOUS AS NEEDED
Status: DISCONTINUED | OUTPATIENT
Start: 2024-07-26 | End: 2024-07-26

## 2024-07-26 RX ORDER — CHLORHEXIDINE GLUCONATE 40 MG/ML
SOLUTION TOPICAL DAILY PRN
Status: DISCONTINUED | OUTPATIENT
Start: 2024-07-26 | End: 2024-07-26 | Stop reason: HOSPADM

## 2024-07-26 RX ORDER — BUPIVACAINE HYDROCHLORIDE 5 MG/ML
INJECTION, SOLUTION EPIDURAL; INTRACAUDAL
Status: COMPLETED | OUTPATIENT
Start: 2024-07-26 | End: 2024-07-26

## 2024-07-26 RX ORDER — CEFAZOLIN SODIUM 2 G/50ML
2000 SOLUTION INTRAVENOUS ONCE
Status: COMPLETED | OUTPATIENT
Start: 2024-07-26 | End: 2024-07-26

## 2024-07-26 RX ORDER — PROPOFOL 10 MG/ML
INJECTION, EMULSION INTRAVENOUS AS NEEDED
Status: DISCONTINUED | OUTPATIENT
Start: 2024-07-26 | End: 2024-07-26

## 2024-07-26 RX ORDER — ONDANSETRON 2 MG/ML
4 INJECTION INTRAMUSCULAR; INTRAVENOUS ONCE AS NEEDED
Status: DISCONTINUED | OUTPATIENT
Start: 2024-07-26 | End: 2024-07-26 | Stop reason: HOSPADM

## 2024-07-26 RX ADMIN — TRANEXAMIC ACID 1000 MG: 10 INJECTION, SOLUTION INTRAVENOUS at 15:50

## 2024-07-26 RX ADMIN — BUPIVACAINE HYDROCHLORIDE 10 ML: 5 INJECTION, SOLUTION EPIDURAL; INTRACAUDAL; PERINEURAL at 15:00

## 2024-07-26 RX ADMIN — LIDOCAINE HYDROCHLORIDE 100 MG: 20 INJECTION, SOLUTION EPIDURAL; INFILTRATION; INTRACAUDAL; PERINEURAL at 15:41

## 2024-07-26 RX ADMIN — BUPIVACAINE 20 ML: 13.3 INJECTION, SUSPENSION, LIPOSOMAL INFILTRATION at 15:00

## 2024-07-26 RX ADMIN — CHLORHEXIDINE GLUCONATE 0.12% ORAL RINSE 15 ML: 1.2 LIQUID ORAL at 12:53

## 2024-07-26 RX ADMIN — DEXAMETHASONE SODIUM PHOSPHATE 10 MG: 10 INJECTION, SOLUTION INTRAMUSCULAR; INTRAVENOUS at 16:05

## 2024-07-26 RX ADMIN — SUGAMMADEX 200 MG: 100 INJECTION, SOLUTION INTRAVENOUS at 17:49

## 2024-07-26 RX ADMIN — CEFAZOLIN SODIUM 2000 MG: 2 SOLUTION INTRAVENOUS at 15:45

## 2024-07-26 RX ADMIN — FENTANYL CITRATE 50 MCG: 50 INJECTION INTRAMUSCULAR; INTRAVENOUS at 15:41

## 2024-07-26 RX ADMIN — ONDANSETRON 4 MG: 2 INJECTION INTRAMUSCULAR; INTRAVENOUS at 16:46

## 2024-07-26 RX ADMIN — PROPOFOL 200 MG: 10 INJECTION, EMULSION INTRAVENOUS at 15:41

## 2024-07-26 RX ADMIN — ROCURONIUM BROMIDE 40 MG: 10 INJECTION, SOLUTION INTRAVENOUS at 15:41

## 2024-07-26 RX ADMIN — MIDAZOLAM HYDROCHLORIDE 2 MG: 1 INJECTION, SOLUTION INTRAMUSCULAR; INTRAVENOUS at 15:00

## 2024-07-26 RX ADMIN — SODIUM CHLORIDE, SODIUM LACTATE, POTASSIUM CHLORIDE, AND CALCIUM CHLORIDE: .6; .31; .03; .02 INJECTION, SOLUTION INTRAVENOUS at 14:00

## 2024-07-26 NOTE — INTERVAL H&P NOTE
H&P reviewed. After examining the patient I find no changes in the patients condition since the H&P had been written.    Vitals:    07/26/24 1236   BP: 117/55   Pulse: 64   Resp: 16   Temp: 97.7 °F (36.5 °C)   SpO2: 98%

## 2024-07-26 NOTE — DISCHARGE INSTR - AVS FIRST PAGE
Lianna Floyd D.O.     [address] [address]  Phone: [phone]    Sports Medicine, Shoulder, Elbow, Knee and Arthroscopic Surgery                          SHOULDER ARTHROSCOPY  Labral Repair  POST-OPERATIVE INSTRUCTIONS - PAGE 1    WOUND CARE:   WHAT IS COVERING MY SHOULDER?  YOUR SHOULDER IS COVERED IN LAYERS: THE DEEPEST LAYER CONSISTS OF NYLON SUTURE THAT COVER THE PORTALS.  ON TOP OF THAT ARE SMALL, BROWN BANDAGES.     WHEN DO I REMOVE MY DRESSINGS AND WHAT DO I TAKE OFF?  DON'T PULL THE SQUARE BANDAGES UNLESS THEY INADVERTANTLY COME OFF. IN THIS SITUATION, COVER THE ACTUAL PORTALS WITH BAND-AIDS AND CHANGE DAILY UNTIL YOU ARE SEEN POST-OPERATIVELY IN THE OFFICE. IF THE BROWN BANDAGES ARE STILL ON, IT IS NORMAL FOR THEM TO BE BLOOD ENCRUSTED. YOU CAN COVER THEM WITH BAND-AIDS AND CHANGE THE BAND-AIDS DAILY UNTIL YOU ARE SEEN POST-OPERATIVELY IN THE OFFICE.      WHEN CAN I SHOWER?    YOU MAY SHOWER AFTER THE FIRST POST-OPERATIVE VISIT WHICH IS TYPICALLY 1 WEEK AFTER SURGERY.  UNTIL THEN SPONGE BATHE. YOU MUST KEEP YOUR WOUNDS CLEAN AND DRY AT ALL TIMES UNTIL YOUR FIRST FOLLOW UP.  THIS IS TO PREVENT INFECTION.    ICE  SHOULDER SWELLING IS EXPECTED! WE SUGGEST THAT YOU APPLY ICE TO THE TOP OF THE SHOULDER FOR THE FIRST THREE DAYS 20 MINUTES EVERY FEW HOURS. THE ICE SHOULD BE SEALED IN A PLASTIC BAG AND THE BAG PLACED IN A TOWEL TO KEEP THE DRESSING DRY.    MEDICATIONS:  THERE ARE TWO BASIC MEDICATIONS THAT YOU MAY RECEIVE SEPARATE FROM YOUR “USUAL” MEDICATIONS. THESE MAY INCLUDE A PAIN PILL AND AN ANTIBIOTIC. TAKE THEM AS PRESCRIBED.  IF YOU HAVE ANY UNUSUAL SYMPTOMS SUCH AS RASHES, ITCHINESS OR DIARRHEA, PLEASE DISCONTINUE THE MEDICATION AND CALL OUR OFFICE OR YOUR MEDICAL DOCTOR. GI DISCOMFORT AND NAUSEA ARE NOT UNUSUAL WITH PAIN MEDICATION AND ANTIBIOTICS, BUT PLEASE CALL IF YOU ARE NOT ABLE TO DRINK OR EAT BY THE NIGHT OF SURGERY.                                          SHOULDER ARTHROSCOPY LABRAL REPAIR  POST-OPERATIVE INSTRUCTIONS - PAGE 2      POST-OPERATIVE EMERGENCIES & CONCERNS:  HEART, LUNG, CALF PROBLEMS                                                                                       IF YOU DEVELOP CHEST PAIN, SHORTNESS OF BREATH OR SIGNIFICANT CALF PAIN, YOU MUST GO TO THE NEAREST EMERGENCY ROOM.   BLEEDING OR DRAINAGE  SOME BLEEDING AND DRAINAGE IS EXPECTED. IF THE BANDAGE BECOMES STAINED AND YOU THINK THAT THE DRAINAGE IS EXCESSIVE…. CALL THE OFFICE OR USE [employer] ONLINE TO MESSAGE DR. GARCIA.  FEVER  IF YOU HAVE A TEMPERATURE GREATER THAN 101 DEGREES ON MORE THAN ONE READING 48 HOURS OR MORE AFTER SURGERY…… CALL THE OFFICE OR USE [employer] ONLINE TO MESSAGE DR. GARCIA.  SWELLING  SWELLING IS USUAL FOR THE FIRST THREE DAYS UNTIL THE FLUID IS DISSIPATED INTO THE DRESSINGS. IF YOU HAVE NUMBNESS, COLDNESS AND TINGLING IN THE UPPER EXTREMITY….. CALL THE OFFICE OR USE [employer] ONLINE TO MESSAGE DR. GARCIA.  UNRELENTING PAIN  IF SEVERE PAIN REMAINS 48 HOURS AFTER SURGERY…. CALL THE OFFICE OR USE [employer] ONLINE TO MESSAGE DR. GARCIA.    ANSWERING SERVICES, CELL PHONES AND BEEPERS ARE NOT PERFECT.  COMMON SENSE DICTATES THAT IF YOU CAN'T GET A HOLD OF YOUR ORTHOPEDIC SURGEON OR YOUR MEDICAL DOCTOR, YOU SHOULD GO TO THE EMERGENCY ROOM TO PLAY IT SAFE !!    GOING TO THE BATHROOM:  TYPICALLY, PATIENTS WILL HAVE URINATED PRIOR TO LEAVING THE HOSPITAL.  IF YOU FIND IT DIFFICULT TO URINATE WHEN YOU ARE AT HOME BY THE EVENING HOURS, PLEASE CALL US OR SIMPLY GO TO THE EMERGENCY ROOM. THIS IS TRUE FOR PATIENTS WITH OR WITHOUT A HISTORY OF PROSTATE DISEASE OR BLADDER PROBLEMS.                                                       SHOULDER ARTHROSCOPY LABRAL REPAIR  POST-OPERATIVE INSTRUCTIONS - PAGE 3    SHOULDER SLING/IMMOBILIZER?  Your arm has been placed in a sling for your comfort and protection. You may adjust it  as necessary to ensure comfort.   During the  first 24 hours, you should wear the sling at all times until your nerve block  completely wears off.   You may remove the sling for bathing or dressing with care not to actively lift your arm  away from the side or extend your arm to the extremes of motion. OTHERWISE YOUR SLING SHOULD BE ON AT ALL TIMES!   Depending on the complexity of the procedure performed, you may wear the sling for  4-6 weeks. This will be discussed in further detail at your first post-operative visit.   It is important to wear the sling at night to prevent undue injury as a result of restless  sleep.  Activity Restriction   During labral repair, your torn labrum is reconnected to the insertion on the bone using sutures and anchors. During the first several weeks following surgery, the strength of this repair is only as strong as the strength of the sutures, and it is critical that no stress is applied to the repair   The following restrictions apply in the immediate postoperative period  o No active lifting of your arm away from your side (ie.- do not lift your arm up or away from your body on your own)  o No carrying anything more than a cup of coffee or daily newspaper  o No pushing or pulling such as opening or closing a door  o Keep your arm in the sling at all times except dressing or bathing   You may bend and straighten your elbow and fingers as much as you want   When you begin physical therapy, your activity and range of motion restrictions will be reviewed in detail.    WHEN DO I BEGIN SUPERVISED PHYSICAL THERAPY?  THIS WILL BE DISCUSSED AT YOUR FIRST POST OPERATIVE VISIT IN 1 WEEK.    WHEN CAN I DRIVE?  YOU CANNOT DRIVE UNTIL GIVEN PERMISSION POST-OPERATIVELY BY YOUR SURGEON.  EVEN WITH PERMISSION, DO NOT DRIVE UNDER THE INFLUENCE OF YOUR POST-OPERATIVE PAIN MEDICATION!

## 2024-07-26 NOTE — OP NOTE
OPERATIVE REPORT  PATIENT NAME: Destin Sims [employer]    :  2000  MRN: [id number]  Pt Location: [facility] OR ROOM 01    SURGERY DATE: 2024    Surgeons and Role:     * Lianna Floyd,  - Primary     * Mickey Lutz PA-C - Assisting    Preop Diagnosis:  Dislocation of right shoulder joint, initial encounter [S43.004A]    Post-Op Diagnosis Codes:     * Dislocation of right shoulder joint, initial encounter [S43.004A]    Procedure(s):  Right - ARTHROSCOPY SHOULDER- right with labral repair.     Specimen(s):  * No specimens in log *    Estimated Blood Loss:   Minimal    Drains:  * No LDAs found *    Anesthesia Type:   General w/ Interscalene Block    Operative Indications:  Dislocation of right shoulder joint, initial encounter [S43.004A]  Anterior-inferior labral tear    Operative Findings:  ALPSA Lesion of the anterior glenoid      Complications:   None    Procedure and Technique:  The patient was seen in the preoperative holding area where the operative extremity was marked.  He was taken to the operating room and placed in the lateral decubitus position.  His right upper extremities prepped and draped in usual sterile fashion.  A timeout was called and patient was ministered IV antibiotics prior to incision.  Prior to incision and evaluation under anesthesia was performed with anterior translation of the glenohumeral joint with the arm in abduction and external rotation showing that the shoulder did not fully dislocate out of the joint.  He did have mild subluxation with anterior translation.  Using an 11 blade knife a posterior portal was established.  The arthroscope was placed in glenohumeral joint.  Glenohumeral cartilage was grossly normal.  The long head biceps tendon was absent from the intra-articular space.  There is a anterior-inferior labral tear that was scarred medially onto the glenoid neck indicative of a ALPSA lesion.  An anterior superior and anterior inferior portal was established.  The  labral capsular complex of the anterior-inferior aspect of the glenoid was released from the medial glenoid neck using a soft tissue elevator.  This was mobilized enough that it could easily reduced onto the glenoid rim.  The anterior glenoid neck was debrided using a combination of arthroscopic shaver and a ball-tipped rasp to allow for bleeding bone.  The alpha lesion was then repaired using four 1.8 mm all suture fiber tack anchors.  A nice bumper was reestablished of the anterior-inferior glenoid.  Evaluation of the Hill-Sachs lesion demonstrated it to be shallow and was on track with abduction and external rotation.  It was decided that a remplissage was not necessary.  The excess that was evacuated from the shoulder.  The portal holes were closed with 3-0 nylon.  Dressings include Mepilex dressing.  The patient was placed in a shoulder immobilizer.  There were no complications throughout the case.  The patient tolerated procedure well.   I was present for the entire procedure., A qualified resident physician was not available., and A physician assistant was required during the procedure for retraction, tissue handling, dissection and suturing.    Patient Disposition:  PACU         SIGNATURE: Lianna Floyd DO  DATE: July 26, 2024  TIME: 5:45 PM

## 2024-07-26 NOTE — ANESTHESIA PROCEDURE NOTES
Peripheral Block    Patient location during procedure: holding area  Start time: 7/26/2024 2:50 PM  Reason for block: at surgeon's request and post-op pain management  Staffing  Performed by: Damian Arreaga MD  Authorized by: Damian Arreaga MD    Preanesthetic Checklist  Completed: patient identified, IV checked, site marked, risks and benefits discussed, surgical consent, monitors and equipment checked, pre-op evaluation and timeout performed  Peripheral Block  Patient position: supine  Prep: ChloraPrep  Patient monitoring: frequent blood pressure checks, continuous pulse oximetry and heart rate  Block type: Interscalene  Laterality: right  Injection technique: single-shot  Procedures: ultrasound guided, Ultrasound guidance required for the procedure to increase accuracy and safety of medication placement and decrease risk of complications.  Ultrasound permanent image saved  bupivacaine (PF) (MARCAINE) 0.5 % injection 20 mL - Perineural   10 mL - 7/26/2024 3:00:00 PM  bupivacaine liposomal (EXPAREL) 1.3 % injection 20 mL - Perineural   20 mL - 7/26/2024 3:00:00 PM  midazolam (VERSED) injection 0.5 mg - Intravenous   2 mg - 7/26/2024 3:00:00 PM  Needle  Needle type: Stimuplex   Needle gauge: 22 G  Needle length: 4 in  Needle localization: anatomical landmarks and ultrasound guidance  Needle insertion depth: 4 cm  Assessment  Injection assessment: incremental injection, frequent aspiration, injected with ease, negative aspiration, negative for heart rate change, no paresthesia on injection, no symptoms of intraneural/intravenous injection and needle tip visualized at all times  Paresthesia pain: none  Post-procedure:  site cleaned  patient tolerated the procedure well with no immediate complications

## 2024-07-26 NOTE — ANESTHESIA POSTPROCEDURE EVALUATION
Post-Op Assessment Note    CV Status:  Stable    Pain management: adequate       Mental Status:  Alert and awake   Hydration Status:  Euvolemic   PONV Controlled:  Controlled   Airway Patency:  Patent     Post Op Vitals Reviewed: Yes    No anethesia notable event occurred.    Staff: CRNA               BP   118/71   Temp 97.7   Pulse 99   Resp 22   SpO2 99

## 2024-07-26 NOTE — ANESTHESIA PREPROCEDURE EVALUATION
Procedure:  ARTHROSCOPY SHOULDER- right with labral repair, possble remplissage and all necessary procedures (Right: Shoulder)    Relevant Problems   No relevant active problems        Physical Exam    Airway    Mallampati score: I  TM Distance: >3 FB  Neck ROM: full     Dental   No notable dental hx     Cardiovascular      Pulmonary      Other Findings    Anesthesia Plan  ASA Score- 1     Anesthesia Type- general with ASA Monitors.         Additional Monitors:     Airway Plan:            Plan Factors-Exercise tolerance (METS): >4 METS.    Chart reviewed.    Patient summary reviewed.    Patient is not a current smoker.      There is medical exclusion for perioperative obstructive sleep apnea risk education.        Induction- intravenous.    Postoperative Plan-         Informed Consent- Anesthetic plan and risks discussed with patient.  I personally reviewed this patient with the CRNA. Discussed and agreed on the Anesthesia Plan with the CRNA..

## 2024-07-26 NOTE — NURSING NOTE
Pt arrived to PACU agitated and restless. He continuously moved about, including laying on abdomen and onto operative shoulder, in the stretcher and was unable to be redirected. Ortho PA and anesthesia team at bedside. Pt settled down after approximately 10 mins and is resting comfortably at this time.

## 2024-07-26 NOTE — INTERVAL H&P NOTE
H&P reviewed. After examining the patient I find no changes in the patients condition since the H&P had been written.    Vitals:    07/26/24 1236   BP: 117/55   Pulse: 64   Resp: 16   Temp: 97.7 °F (36.5 °C)   SpO2: 98%     Plan for right shoulder scope, labral repair, possible remplissage today

## 2024-08-01 ENCOUNTER — OFFICE VISIT (OUTPATIENT)
Dept: OBGYN CLINIC | Facility: CLINIC | Age: 24
End: 2024-08-01

## 2024-08-01 DIAGNOSIS — M25.311 INSTABILITY OF RIGHT SHOULDER JOINT: Primary | ICD-10-CM

## 2024-08-01 DIAGNOSIS — Z09 POSTOP CHECK: ICD-10-CM

## 2024-08-01 DIAGNOSIS — S43.004A DISLOCATION OF RIGHT SHOULDER JOINT, INITIAL ENCOUNTER: ICD-10-CM

## 2024-08-01 PROCEDURE — 99024 POSTOP FOLLOW-UP VISIT: CPT | Performed by: ORTHOPAEDIC SURGERY

## 2024-08-01 NOTE — PROGRESS NOTES
[facility]THO [facility]ARE [facility] [address] [address] [phone]       Destin Sims [facility]  [id number]  2000    ORTHOPAEDIC SURGERY OUTPATIENT NOTE  8/1/2024      HISTORY:  24 y.o. male  is 6 day s/p right shoulder arthroscopy labral  tear, DOS 7/26/24.  Patient is doing well.  There is no issues since his surgery.  His pain is adequately controlled.    History reviewed. No pertinent past medical history.    Past Surgical History:   Procedure Laterality Date    FL INJECTION RIGHT SHOULDER (ARTHROGRAM)  6/17/2024    [place] SURGICAL ARTHROSCOPY SHOULDER CAPSULORRHAPHY Right 7/26/2024    Procedure: ARTHROSCOPY SHOULDER- right with labral repair, possble remplissage and all necessary procedures;  Surgeon: Lianna Floyd DO;  Location:  MAIN OR;  Service: Orthopedics       Social History     Socioeconomic History    Marital status: /Civil Union     Spouse name: Not on file    Number of children: Not on file    Years of education: Not on file    Highest education level: Not on file   Occupational History    Not on file   Tobacco Use    Smoking status: Never    Smokeless tobacco: Never   Vaping Use    Vaping status: Never Used   Substance and Sexual Activity    Alcohol use: Yes     Comment: social    Drug use: Never    Sexual activity: Not on file   Other Topics Concern    Not on file   Social History Narrative    Not on file     Social Determinants of Health     Financial Resource Strain: Not on file   Food Insecurity: Not on file   Transportation Needs: Not on file   Physical Activity: Not on file   Stress: Not on file   Social Connections: Unknown (6/18/2024)    Received from [employer]     How often do you feel lonely or isolated from those around you? (Adult - for ages 18 years and over): Not on file   Intimate Partner Violence: Not on file   Housing Stability: Not on file       Family History   Problem  Relation Age of Onset    No Known Problems Mother     No Known Problems Father         Patient's Medications    No medications on file       No Known Allergies     There were no vitals taken for this visit.     REVIEW OF SYSTEMS:  Constitutional: Negative.    HEENT: Negative.    Respiratory: Negative.    Skin: Negative.    Neurological: Negative.    Psychiatric/Behavioral: Negative.  Musculoskeletal: Negative except for that mentioned in the HPI.      PHYSICAL EXAM:    Right shoulder: Neurovascular intact.  Incision sites are clean dry and intact.  No signs of infection.    IMAGING: Not taken today     ASSESSMENT AND PLAN:  24 y.o. male  s/p right shoulder arthroscopy labral  tear, DOS 7/26/24.    Will get the patient to formal physical therapy for anterior labral stabilization protocol.  He will follow-up in 5 weeks for repeat clinical evaluation

## 2024-08-05 ENCOUNTER — EVALUATION (OUTPATIENT)
Dept: PHYSICAL THERAPY | Facility: CLINIC | Age: 24
End: 2024-08-05
Payer: COMMERCIAL

## 2024-08-05 DIAGNOSIS — S43.004A DISLOCATION OF RIGHT SHOULDER JOINT, INITIAL ENCOUNTER: ICD-10-CM

## 2024-08-05 DIAGNOSIS — Z09 POSTOP CHECK: ICD-10-CM

## 2024-08-05 DIAGNOSIS — M25.311 INSTABILITY OF RIGHT SHOULDER JOINT: ICD-10-CM

## 2024-08-05 DIAGNOSIS — Z98.890 STATUS POST REPAIR OF GLENOID LABRUM: Primary | ICD-10-CM

## 2024-08-05 PROCEDURE — 97110 THERAPEUTIC EXERCISES: CPT

## 2024-08-05 PROCEDURE — 97161 PT EVAL LOW COMPLEX 20 MIN: CPT

## 2024-08-05 NOTE — PROGRESS NOTES
PT Evaluation     Today's date: 2024  Patient name: Destin Melo  : 2000  MRN: [id number]  Referring provider: Lianna Floyd DO  Dx:   Encounter Diagnosis     ICD-10-CM    1. Status post repair of glenoid labrum  Z98.890       2. Instability of right shoulder joint  M25.311 Ambulatory referral to Physical Therapy      3. Dislocation of right shoulder joint, initial encounter  S43.004A Ambulatory referral to Physical Therapy      4. Postop check  Z09 Ambulatory referral to Physical Therapy          Start Time: 1034  Stop Time: 1112  Total time in clinic (min): 38 minutes    Assessment  Impairments: abnormal or restricted ROM, activity intolerance, impaired physical strength, lacks appropriate home exercise program, pain with function, weight-bearing intolerance, unable to perform ADL, participation limitations and activity limitations  Symptom irritability: low    Assessment details: Destin Melo is a pleasant 24 y.o. male who presents in sling 10 days post op R labrum repair. Upon eval today, he has decreased R shld ROM, impaired R shld strength, and pain with R shld PROM resulting in inability to perform ADLs, inability to perform work duties, and inability to perform previous running and lifting routine. Concerns: inability to work and fear of not being able to keep active.  No further referral appears necessary at this time based upon examination results. Positive prognostic indicators include positive attitude toward recovery and good understanding of diagnosis and treatment plan options.  Negative prognostic indicators include none.      Comparable signs:  1) R shld PROM  2) R shld palpation     Problem List:  1) Limited R shld ROM  2) Impaired R shld strength   3) Unable to work  4) Difficulty with ADLs  5) Decreased pt IND  6) Unable to perform previous exercise routine      During today's session, the patient was provided with education surrounding surgeon's protocol and  current precautions. All patient questions were answered. Patient verbalized understanding of all of the information provided.  Understanding of Dx/Px/POC: good     Prognosis: good    Goals  Impairment based goals  Patient will improve R shld PROM as follows: ER to 30 deg at side, IR to WFL, Flex to WFL, Abd to WFL within 6 weeks.   Patient will improve R shld AROM to WFL in all directions by time of d/c.   Patient will demonstrate 50% improvement in R shld pain to decrease discomfort with ADLs.   Patient will report </= 2/10 R shld pain at worst to return to all activities with minimal discomfort.         Functional based goals to be completed by time of d/c  Patient will improve FOTO to greater than predicted value.  Patient will be independent with home exercise program.   Patient will be able to manage symptoms independently.   Patient will return to all work duties and previous exercise routine without residual deficits.         **Strength to be assessed and goals to be added when applicable         Plan  Patient would benefit from: skilled physical therapy  Planned modality interventions: biofeedback, cryotherapy, low level laser therapy, neuromuscular electric stimulation, TENS, ultrasound, thermotherapy: hydrocollator packs and electrical stimulation/Russian stimulation    Planned therapy interventions: abdominal trunk stabilization, activity modification, ADL training, balance/weight bearing training, gait training, home exercise program, therapeutic exercise, therapeutic activities, stretching, strengthening, patient education, neuromuscular re-education, postural training, therapeutic training, joint mobilization, IASTM, kinesiology taping, manual therapy, massage, Vyas taping, motor coordination training, muscle pump exercises, nerve gliding, self care, work reintegration, fine motor coordination training, flexibility, functional ROM exercises, graded activity, graded exercise, graded motor, IADL  retraining, balance, behavior modification, body mechanics training, breathing training, transfer training and coordination    Frequency: 2-3x week  Duration in weeks: 12  Treatment plan discussed with: patient        Subjective Evaluation    History of Present Illness  Mechanism of injury: Pt is post op R labral repair on 24. Pt reports he had a reaction to the anesthesia but otherwise denies surgical complications. Pt reports N+T in R 4th and 5th digit when lying supine. N+T goes away with change in position. Pt would like to get back to work, running, and previous exercise routine.   Quality of life: good    Patient Goals  Patient goals for therapy: increased strength and increased motion    Pain  Current pain ratin  At best pain ratin  At worst pain ratin  Location: R shld  Quality: dull ache  Relieving factors: medications and rest  Exacerbated by: positional.  Progression: improved    Social Support    Working: .  Hand dominance: right          Objective    R shld:  Flex PROM 82 deg p!  Abd PROM 80 deg p!  ER PROM 0 deg p!    R elbow:   Flex PROM 150 deg  Ext  PROM 0 deg     Flowsheet Rows      Flowsheet Row Most Recent Value   PT/OT G-Codes    Current Score 30   Projected Score 73        L shld:  Flex PROM 180 deg  Abd PROM 180 deg  ER PROM 90 deg  IR PROM 80 deg     L elbow:  Flex PROM 150 deg  Ext PROM 0 deg      Precautions: follow Rai post-op protocol for shoulder labrum repair (attached to pt's chart)  History reviewed. No pertinent past medical history.  No Known Allergies      Access Code: [email or web address]  URL: [email or web address]  Date: 2024  Prepared by: Mary Sagastume    Exercises  - Seated Elbow Flexion and Extension AROM  - 3 x daily - 7 x weekly - 3 sets - 10 reps  - Seated Wrist Radial Ulnar Deviation AROM  - 3 x daily - 7 x weekly - 3 sets - 10 reps  - Wrist Flexion AROM  - 3 x daily - 7 x weekly - 3 sets - 10 reps  - Wrist Extension AROM  - 3 x daily - 7  x weekly - 3 sets - 10 reps  - Seated Forearm Pronation and Supination AROM  - 3 x daily - 7 x weekly - 3 sets - 10 reps  - Seated Gripping Towel  - 3 x daily - 7 x weekly - 3 sets - 10 reps - 5 sec hold    *Stressed to pt importance of keeping elbow at side for all exercises    Manuals 8/5            PROM R shld *follow precautions                                                    Neuro Re-Ed                                                                                                        Ther Ex             Pt education On protocol, precautions, purpose of PT, expected outcomes, HEP 18'            Elbow flex/ext AROM             Pro/sup AROM             Wrist flex/ext AROM             Wrist UD/RD AROM             Towel  squeezes             Digiflex             Web             Ther Activity                                       Gait Training                                       Modalities

## 2024-08-06 ENCOUNTER — OFFICE VISIT (OUTPATIENT)
Dept: PHYSICAL THERAPY | Facility: CLINIC | Age: 24
End: 2024-08-06
Payer: COMMERCIAL

## 2024-08-06 DIAGNOSIS — S43.004A DISLOCATION OF RIGHT SHOULDER JOINT, INITIAL ENCOUNTER: ICD-10-CM

## 2024-08-06 DIAGNOSIS — M25.311 INSTABILITY OF RIGHT SHOULDER JOINT: Primary | ICD-10-CM

## 2024-08-06 DIAGNOSIS — Z09 POSTOP CHECK: ICD-10-CM

## 2024-08-06 PROCEDURE — 97140 MANUAL THERAPY 1/> REGIONS: CPT

## 2024-08-06 PROCEDURE — 97110 THERAPEUTIC EXERCISES: CPT

## 2024-08-06 NOTE — PROGRESS NOTES
Daily Note     Today's date: 2024  Patient name: Destin Vidaletit  : 2000  MRN: [id number]  Referring provider: Lianna Floyd DO  Dx:   Encounter Diagnosis     ICD-10-CM    1. Instability of right shoulder joint  M25.311       2. Dislocation of right shoulder joint, initial encounter  S43.004A       3. Postop check  Z09                      Subjective: Pt reports no change in condition. HEP was easy.       Objective: See treatment diary below      Assessment: Wife educated on PROM per primary PT request (Mary Benoit, PT, DPT) as patient is going on a trip and does not want to fall behind on protocol while traveling. PROM meets protocol limits with empty and pain free end feel. Good distal extremity motion.       Plan: Continue per plan of care.  Progress treatment as tolerated.       Precautions: follow Mariel post-op protocol for shoulder labrum repair (attached to pt's chart)  History reviewed. No pertinent past medical history.  No Known Allergies      Access Code: [email or web address]  URL: [email or web address]  Date: 2024  Prepared by: Mary Sagastume    Exercises  - Seated Elbow Flexion and Extension AROM  - 3 x daily - 7 x weekly - 3 sets - 10 reps  - Seated Wrist Radial Ulnar Deviation AROM  - 3 x daily - 7 x weekly - 3 sets - 10 reps  - Wrist Flexion AROM  - 3 x daily - 7 x weekly - 3 sets - 10 reps  - Wrist Extension AROM  - 3 x daily - 7 x weekly - 3 sets - 10 reps  - Seated Forearm Pronation and Supination AROM  - 3 x daily - 7 x weekly - 3 sets - 10 reps  - Seated Gripping Towel  - 3 x daily - 7 x weekly - 3 sets - 10 reps - 5 sec hold    *Stressed to pt importance of keeping elbow at side for all exercises    Manuals            PROM R shld *follow precautions  15'                                                  Neuro Re-Ed                                                                                                        Ther Ex             Pt education On  protocol, precautions, purpose of PT, expected outcomes, HEP 18'            Elbow flex/ext AROM  3x10           Pro/sup AROM  3x10           Wrist flex/ext AROM  3x10           Wrist UD/RD AROM  3x10           Towel  squeezes             Digiflex  Blue 3x10           Web             Ther Activity                                       Gait Training                                       Modalities

## 2024-08-08 ENCOUNTER — OFFICE VISIT (OUTPATIENT)
Dept: PHYSICAL THERAPY | Facility: CLINIC | Age: 24
End: 2024-08-08
Payer: COMMERCIAL

## 2024-08-08 DIAGNOSIS — S43.004A DISLOCATION OF RIGHT SHOULDER JOINT, INITIAL ENCOUNTER: ICD-10-CM

## 2024-08-08 DIAGNOSIS — M25.311 INSTABILITY OF RIGHT SHOULDER JOINT: Primary | ICD-10-CM

## 2024-08-08 PROCEDURE — 97110 THERAPEUTIC EXERCISES: CPT

## 2024-08-08 PROCEDURE — 97140 MANUAL THERAPY 1/> REGIONS: CPT

## 2024-08-08 NOTE — PROGRESS NOTES
Daily Note     Today's date: 2024  Patient name: Destin Melo  : 2000  MRN: [id number]  Referring provider: Lianna Floyd DO  Dx:   Encounter Diagnosis     ICD-10-CM    1. Instability of right shoulder joint  M25.311       2. Dislocation of right shoulder joint, initial encounter  S43.004A           Start Time: 1130  Stop Time: 1200  Total time in clinic (min): 30 minutes    Subjective: Pt reports minimal pain in R shld.      Objective: See treatment diary below      Assessment: Pt leaving for vacation this weekend. Pt asked if he could go in the ocean. Advised pt to not go in above waist level to keep incision clean and dry and to avoid water motion from disrupting position of shld. Reminded pt that sling needs to be worn at all times except for showering and PT exercises. Pt verbalized understanding. Pt's wife efficient with performing PROM to pt's R shld while away on vacation. Pt and wife have a copy of surgeon's protocol and are aware of ROM guidelines. Will reassess pt when he returns from vacation.       Plan: Continue per plan of care.  Progress treament per protocol.      Precautions: follow Mariel post-op protocol for shoulder labrum repair (attached to pt's chart)  History reviewed. No pertinent past medical history.  No Known Allergies      Access Code: [email or web address]  URL: [email or web address]  Date: 2024  Prepared by: Mary Sagastume    Exercises  - Seated Elbow Flexion and Extension AROM  - 3 x daily - 7 x weekly - 3 sets - 10 reps  - Seated Wrist Radial Ulnar Deviation AROM  - 3 x daily - 7 x weekly - 3 sets - 10 reps  - Wrist Flexion AROM  - 3 x daily - 7 x weekly - 3 sets - 10 reps  - Wrist Extension AROM  - 3 x daily - 7 x weekly - 3 sets - 10 reps  - Seated Forearm Pronation and Supination AROM  - 3 x daily - 7 x weekly - 3 sets - 10 reps  - Seated Gripping Towel  - 3 x daily - 7 x weekly - 3 sets - 10 reps - 5 sec hold    *Stressed to pt importance of keeping  elbow at side for all exercises    Manuals 8/5 8/6 8/8          PROM R shld *follow precautions  15' 15'                                                 Neuro Re-Ed                                                                                                        Ther Ex             Pt education On protocol, precautions, purpose of PT, expected outcomes, HEP 18'            Elbow flex/ext AROM  3x10 3x10          Pro/sup AROM  3x10 3x10          Wrist flex/ext AROM  3x10 3x10          Wrist UD/RD AROM  3x10 3x10          Digiflex  Blue 3x10 Black 3x10          Web   Blue / red 3x10          Ther Activity                                       Gait Training                                       Modalities

## 2024-08-20 ENCOUNTER — OFFICE VISIT (OUTPATIENT)
Dept: PHYSICAL THERAPY | Facility: CLINIC | Age: 24
End: 2024-08-20
Payer: COMMERCIAL

## 2024-08-20 DIAGNOSIS — S43.004A DISLOCATION OF RIGHT SHOULDER JOINT, INITIAL ENCOUNTER: ICD-10-CM

## 2024-08-20 DIAGNOSIS — M25.311 INSTABILITY OF RIGHT SHOULDER JOINT: Primary | ICD-10-CM

## 2024-08-20 DIAGNOSIS — Z98.890 STATUS POST REPAIR OF GLENOID LABRUM: ICD-10-CM

## 2024-08-20 DIAGNOSIS — Z09 POSTOP CHECK: ICD-10-CM

## 2024-08-20 PROCEDURE — 97140 MANUAL THERAPY 1/> REGIONS: CPT

## 2024-08-20 PROCEDURE — 97110 THERAPEUTIC EXERCISES: CPT

## 2024-08-20 NOTE — PROGRESS NOTES
Daily Note     Today's date: 2024  Patient name: Destin Vidaletit  : 2000  MRN: [id number]  Referring provider: Lianna Floyd DO  Dx:   Encounter Diagnosis     ICD-10-CM    1. Instability of right shoulder joint  M25.311       2. Dislocation of right shoulder joint, initial encounter  S43.004A       3. Postop check  Z09       4. Status post repair of glenoid labrum  Z98.890           Start Time: 1137  Stop Time: 1205  Total time in clinic (min): 28 minutes    Subjective: Patient reports having no issue over his vacation. No pain prior to therapy.     Objective: See treatment diary below    Assessment: Patient is progressing well thus far with the current surgical protocol. No pain with PROM.      Plan: Continue per plan of care.  Progress treament per protocol.      Precautions: follow Mariel post-op protocol for shoulder labrum repair (attached to pt's chart)  History reviewed. No pertinent past medical history.  No Known Allergies    Access Code: [email or web address]  URL: [email or web address]  Date: 2024  Prepared by: Mary Sagastume    Exercises  - Seated Elbow Flexion and Extension AROM  - 3 x daily - 7 x weekly - 3 sets - 10 reps  - Seated Wrist Radial Ulnar Deviation AROM  - 3 x daily - 7 x weekly - 3 sets - 10 reps  - Wrist Flexion AROM  - 3 x daily - 7 x weekly - 3 sets - 10 reps  - Wrist Extension AROM  - 3 x daily - 7 x weekly - 3 sets - 10 reps  - Seated Forearm Pronation and Supination AROM  - 3 x daily - 7 x weekly - 3 sets - 10 reps  - Seated Gripping Towel  - 3 x daily - 7 x weekly - 3 sets - 10 reps - 5 sec hold    *Stressed to pt importance of keeping elbow at side for all exercises    Manuals          PROM R shld *follow precautions  15' 15' 15'                                                Neuro Re-Ed                                                                                                        Ther Ex             Pt education On protocol,  Pt. discharge with questions answered and pt/failmy verbalized understanding. Will call if any questions arise. precautions, purpose of PT, expected outcomes, HEP 18'            Elbow flex/ext AROM  3x10 3x10 3x10         Pro/sup AROM  3x10 3x10 3x10         Wrist flex/ext AROM  3x10 3x10 3x10         Wrist UD/RD AROM  3x10 3x10 3x10         Digiflex  Blue 3x10 Black 3x10 [id number]         Web   Blue / red 3x10          Ther Activity                                       Gait Training                                       Modalities

## 2024-08-22 ENCOUNTER — OFFICE VISIT (OUTPATIENT)
Dept: PHYSICAL THERAPY | Facility: CLINIC | Age: 24
End: 2024-08-22
Payer: COMMERCIAL

## 2024-08-22 DIAGNOSIS — M25.311 INSTABILITY OF RIGHT SHOULDER JOINT: Primary | ICD-10-CM

## 2024-08-22 DIAGNOSIS — Z09 POSTOP CHECK: ICD-10-CM

## 2024-08-22 DIAGNOSIS — S43.004A DISLOCATION OF RIGHT SHOULDER JOINT, INITIAL ENCOUNTER: ICD-10-CM

## 2024-08-22 DIAGNOSIS — Z98.890 STATUS POST REPAIR OF GLENOID LABRUM: ICD-10-CM

## 2024-08-22 PROCEDURE — 97110 THERAPEUTIC EXERCISES: CPT

## 2024-08-22 PROCEDURE — 97140 MANUAL THERAPY 1/> REGIONS: CPT

## 2024-08-22 NOTE — PROGRESS NOTES
Daily Note     Today's date: 2024  Patient name: Destin Vidaletit  : 2000  MRN: [id number]  Referring provider: Lianna Floyd DO  Dx: No diagnosis found.    Start Time: 1004  Stop Time: 1027  Total time in clinic (min): 23 minutes    Subjective: Pt reports no pain before, during, and after PT session.      Objective: See treatment diary below      Assessment: Tolerated treatment well. No c/o pain t/o session. Pt meeting phase I PROM goals listed in surgeon's protocol. Pt would benefit from cont skilled PT to improve post-op outcomes.        Plan: Progress treament per protocol.      Precautions: follow Mariel post-op protocol for shoulder labrum repair (attached to pt's chart)  History reviewed. No pertinent past medical history.  No Known Allergies    Access Code: [email or web address]  URL: [email or web address]  Date: 2024  Prepared by: Mary Sagastume    Exercises  - Seated Elbow Flexion and Extension AROM  - 3 x daily - 7 x weekly - 3 sets - 10 reps  - Seated Wrist Radial Ulnar Deviation AROM  - 3 x daily - 7 x weekly - 3 sets - 10 reps  - Wrist Flexion AROM  - 3 x daily - 7 x weekly - 3 sets - 10 reps  - Wrist Extension AROM  - 3 x daily - 7 x weekly - 3 sets - 10 reps  - Seated Forearm Pronation and Supination AROM  - 3 x daily - 7 x weekly - 3 sets - 10 reps  - Seated Gripping Towel  - 3 x daily - 7 x weekly - 3 sets - 10 reps - 5 sec hold    *Stressed to pt importance of keeping elbow at side for all exercises    Manuals         PROM R shld *follow precautions  15' 15' 15' 12'                                               Neuro Re-Ed                                                                                                        Ther Ex             Pt education On protocol, precautions, purpose of PT, expected outcomes, HEP 18'    5' review of protocol        Elbow flex/ext AROM  3x10 3x10 3x10 3x10        Pro/sup AROM  3x10 3x10 3x10 3x10        Wrist  flex/ext AROM  3x10 3x10 3x10 3x10        Wrist UD/RD AROM  3x10 3x10 3x10 3x10        Digiflex  Blue 3x10 Black 3x10 [id number] Black 3x10        Ther Activity                                       Gait Training                                       Modalities

## 2024-08-23 ENCOUNTER — OFFICE VISIT (OUTPATIENT)
Dept: PHYSICAL THERAPY | Facility: CLINIC | Age: 24
End: 2024-08-23
Payer: COMMERCIAL

## 2024-08-23 DIAGNOSIS — M25.311 INSTABILITY OF RIGHT SHOULDER JOINT: Primary | ICD-10-CM

## 2024-08-23 DIAGNOSIS — Z09 POSTOP CHECK: ICD-10-CM

## 2024-08-23 DIAGNOSIS — Z98.890 STATUS POST REPAIR OF GLENOID LABRUM: ICD-10-CM

## 2024-08-23 DIAGNOSIS — S43.004A DISLOCATION OF RIGHT SHOULDER JOINT, INITIAL ENCOUNTER: ICD-10-CM

## 2024-08-23 PROCEDURE — 97140 MANUAL THERAPY 1/> REGIONS: CPT

## 2024-08-23 PROCEDURE — 97110 THERAPEUTIC EXERCISES: CPT

## 2024-08-23 NOTE — PROGRESS NOTES
Daily Note     Today's date: 2024  Patient name: Destin Sims Prepetit  : 2000  MRN: [id number]  Referring provider: Lianna Floyd DO  Dx:   Encounter Diagnosis     ICD-10-CM    1. Instability of right shoulder joint  M25.311       2. Dislocation of right shoulder joint, initial encounter  S43.004A       3. Postop check  Z09       4. Status post repair of glenoid labrum  Z98.890           Start Time: 0945  Stop Time: 1010  Total time in clinic (min): 25 minutes    Subjective: Pt reports L shld is a little sore at start of PT session. Pt reports improvement in L shld discomfort at conclusion of PT session.      Objective: See treatment diary below      Assessment: Pt with minimal discomfort L shld 2* progressing PROM. Patient tolerated addition of pendulums to POC without c/o pain. Stressed to pt importance of using whole body weight shifting and affect of gravity rather than actively moving L shld while performing pendulums. Pt verbalized understanding and consistently demonstrated proper form. Added pendulums to pt's HEP.     Plan: Progress treament per protocol.      Precautions: follow Mariel post-op protocol for shoulder labrum repair (attached to pt's chart)  History reviewed. No pertinent past medical history.  No Known Allergies    Access Code: [email or web address]  URL: [email or web address]  Date: 2024  Prepared by: Mary Sagastume    Exercises  - Seated Elbow Flexion and Extension AROM  - 3 x daily - 7 x weekly - 3 sets - 10 reps  - Seated Wrist Radial Ulnar Deviation AROM  - 3 x daily - 7 x weekly - 3 sets - 10 reps  - Wrist Flexion AROM  - 3 x daily - 7 x weekly - 3 sets - 10 reps  - Wrist Extension AROM  - 3 x daily - 7 x weekly - 3 sets - 10 reps  - Seated Forearm Pronation and Supination AROM  - 3 x daily - 7 x weekly - 3 sets - 10 reps  - Seated Gripping Towel  - 3 x daily - 7 x weekly - 3 sets - 10 reps - 5 sec hold    *Stressed to pt importance of keeping elbow at side for all  exercises    Manuals 8/5 8/6 8/8 8/20 8/22 8/23       PROM R shld *follow precautions  15' 15' 15' 12' 10'                                              Neuro Re-Ed             Pendulums      2x10 sag plane    2x10 frontal plane    2x10 circles                                                                                     Ther Ex             Pt education On protocol, precautions, purpose of PT, expected outcomes, HEP 18'    5' review of protocol        Elbow flex/ext AROM  3x10 3x10 3x10 3x10 3x10       Pro/sup AROM  3x10 3x10 3x10 3x10 3x10       Wrist flex/ext AROM  3x10 3x10 3x10 3x10 3x10       Wrist UD/RD AROM  3x10 3x10 3x10 3x10 3x10       Digiflex  Blue 3x10 Black 3x10 [id number] Black 3x10 Black 3x10                     Ther Activity                                       Gait Training                                       Modalities

## 2024-08-26 ENCOUNTER — APPOINTMENT (OUTPATIENT)
Dept: PHYSICAL THERAPY | Facility: CLINIC | Age: 24
End: 2024-08-26
Payer: COMMERCIAL

## 2024-08-28 ENCOUNTER — OFFICE VISIT (OUTPATIENT)
Dept: PHYSICAL THERAPY | Facility: CLINIC | Age: 24
End: 2024-08-28
Payer: COMMERCIAL

## 2024-08-28 DIAGNOSIS — S43.004A DISLOCATION OF RIGHT SHOULDER JOINT, INITIAL ENCOUNTER: ICD-10-CM

## 2024-08-28 DIAGNOSIS — Z98.890 STATUS POST REPAIR OF GLENOID LABRUM: ICD-10-CM

## 2024-08-28 DIAGNOSIS — M25.311 INSTABILITY OF RIGHT SHOULDER JOINT: Primary | ICD-10-CM

## 2024-08-28 DIAGNOSIS — Z09 POSTOP CHECK: ICD-10-CM

## 2024-08-28 PROCEDURE — 97112 NEUROMUSCULAR REEDUCATION: CPT

## 2024-08-28 PROCEDURE — 97110 THERAPEUTIC EXERCISES: CPT

## 2024-08-28 PROCEDURE — 97140 MANUAL THERAPY 1/> REGIONS: CPT

## 2024-08-28 NOTE — PROGRESS NOTES
Daily Note     Today's date: 2024  Patient name: Destin Sims Prepetit  : 2000  MRN: [id number]  Referring provider: Lianna Floyd DO  Dx:   Encounter Diagnosis     ICD-10-CM    1. Instability of right shoulder joint  M25.311       2. Dislocation of right shoulder joint, initial encounter  S43.004A       3. Postop check  Z09       4. Status post repair of glenoid labrum  Z98.890           Start Time: 1004  Stop Time: 1042  Total time in clinic (min): 38 minutes    Subjective: Pt reports no pain start of PT session.      Objective: See treatment diary below  R shld flex PROM: full  R shld abd PROM: 140 deg   R shld ER PROM: 30 deg  R shld IR PROM: full       Assessment: Tolerated treatment well. Pt with increased soreness following PROM stretching. No pain with TE. Increased sets of pendulums. Pt demonstrates good understanding of current goals and precautions.       Plan: Progress treament per protocol.      Precautions: follow Mariel post-op protocol for shoulder labrum repair (attached to pt's chart)  History reviewed. No pertinent past medical history.  No Known Allergies    Access Code: [email or web address]  URL: [email or web address]  Date: 2024  Prepared by: Mary Sagastume    Exercises  - Seated Elbow Flexion and Extension AROM  - 3 x daily - 7 x weekly - 3 sets - 10 reps  - Seated Wrist Radial Ulnar Deviation AROM  - 3 x daily - 7 x weekly - 3 sets - 10 reps  - Wrist Flexion AROM  - 3 x daily - 7 x weekly - 3 sets - 10 reps  - Wrist Extension AROM  - 3 x daily - 7 x weekly - 3 sets - 10 reps  - Seated Forearm Pronation and Supination AROM  - 3 x daily - 7 x weekly - 3 sets - 10 reps  - Seated Gripping Towel  - 3 x daily - 7 x weekly - 3 sets - 10 reps - 5 sec hold    *Stressed to pt importance of keeping elbow at side for all exercises    Manuals       PROM R shld *follow precautions  15' 15' 15' 12' 10' 15'                                              Neuro Re-Ed             Pendulums      2x10 sag plane    2x10 frontal plane    2x10 circles 3x10 sag plane    3x10 frontal plane    3x10 circles                                                                                    Ther Ex             Pt education On protocol, precautions, purpose of PT, expected outcomes, HEP 18'    5' review of protocol        Elbow flex/ext AROM  3x10 3x10 3x10 3x10 3x10 3x10      Pro/sup AROM  3x10 3x10 3x10 3x10 3x10 3x10      Wrist flex/ext AROM  3x10 3x10 3x10 3x10 3x10 3x10      Wrist UD/RD AROM  3x10 3x10 3x10 3x10 3x10 3x10      Digiflex  Blue 3x10 Black 3x10 [id number] Black 3x10 Black 3x10  Black 3x10                    Ther Activity                                       Gait Training                                       Modalities

## 2024-08-30 ENCOUNTER — OFFICE VISIT (OUTPATIENT)
Dept: PHYSICAL THERAPY | Facility: CLINIC | Age: 24
End: 2024-08-30
Payer: COMMERCIAL

## 2024-08-30 DIAGNOSIS — Z98.890 STATUS POST REPAIR OF GLENOID LABRUM: ICD-10-CM

## 2024-08-30 DIAGNOSIS — Z09 POSTOP CHECK: ICD-10-CM

## 2024-08-30 DIAGNOSIS — M25.311 INSTABILITY OF RIGHT SHOULDER JOINT: Primary | ICD-10-CM

## 2024-08-30 DIAGNOSIS — S43.004A DISLOCATION OF RIGHT SHOULDER JOINT, INITIAL ENCOUNTER: ICD-10-CM

## 2024-08-30 PROCEDURE — 97112 NEUROMUSCULAR REEDUCATION: CPT

## 2024-08-30 PROCEDURE — 97110 THERAPEUTIC EXERCISES: CPT

## 2024-08-30 PROCEDURE — 97140 MANUAL THERAPY 1/> REGIONS: CPT

## 2024-08-30 NOTE — PROGRESS NOTES
Daily Note     Today's date: 2024  Patient name: Destin Sims Prepetit  : 2000  MRN: [id number]  Referring provider: Lianna Floyd DO  Dx:   Encounter Diagnosis     ICD-10-CM    1. Instability of right shoulder joint  M25.311       2. Dislocation of right shoulder joint, initial encounter  S43.004A       3. Postop check  Z09       4. Status post repair of glenoid labrum  Z98.890           Start Time: 1005  Stop Time: 1040  Total time in clinic (min): 35 minutes    Subjective: Pt reports mild soreness/discomfort R shld while therapist performs PROM.      Objective: See treatment diary below      Assessment: Tolerated treatment well. Pt demonstrates good technique with all exercises. Pt's PROM cont to improve.       Plan: Progress treament per protocol.      Precautions: follow Mariel post-op protocol for shoulder labrum repair (attached to pt's chart)  History reviewed. No pertinent past medical history.  No Known Allergies      Manuals      PROM R shld *follow precautions  15' 15' 15' 12' 10' 15' 15'                                            Neuro Re-Ed             Pendulums      2x10 sag plane    2x10 frontal plane    2x10 circles 3x10 sag plane    3x10 frontal plane    3x10 circles 3x10 sag plane    3x10 frontal plane    3x10 circles                                                                                   Ther Ex             Pt education On protocol, precautions, purpose of PT, expected outcomes, HEP 18'    5' review of protocol        Elbow flex/ext AROM  3x10 3x10 3x10 3x10 3x10 3x10 3x10     Pro/sup AROM  3x10 3x10 3x10 3x10 3x10 3x10 3x10     Wrist flex/ext AROM  3x10 3x10 3x10 3x10 3x10 3x10 3x10     Wrist UD/RD AROM  3x10 3x10 3x10 3x10 3x10 3x10 3x10     Digiflex  Blue 3x10 Black 3x10 [id number] Black 3x10 Black 3x10  Black 3x10  Black 3x10                  Ther Activity                                       Gait Training                                        Modalities

## 2024-09-03 ENCOUNTER — OFFICE VISIT (OUTPATIENT)
Dept: PHYSICAL THERAPY | Facility: CLINIC | Age: 24
End: 2024-09-03
Payer: COMMERCIAL

## 2024-09-03 DIAGNOSIS — M25.311 INSTABILITY OF RIGHT SHOULDER JOINT: Primary | ICD-10-CM

## 2024-09-03 DIAGNOSIS — S43.004A DISLOCATION OF RIGHT SHOULDER JOINT, INITIAL ENCOUNTER: ICD-10-CM

## 2024-09-03 DIAGNOSIS — Z09 POSTOP CHECK: ICD-10-CM

## 2024-09-03 DIAGNOSIS — Z98.890 STATUS POST REPAIR OF GLENOID LABRUM: ICD-10-CM

## 2024-09-03 PROCEDURE — 97110 THERAPEUTIC EXERCISES: CPT

## 2024-09-03 PROCEDURE — 97112 NEUROMUSCULAR REEDUCATION: CPT

## 2024-09-03 NOTE — PROGRESS NOTES
Daily Note     Today's date: 9/3/2024  Patient name: Destin Sims Prepetit  : 2000  MRN: [id number]  Referring provider: Lianna Floyd DO  Dx:   Encounter Diagnosis     ICD-10-CM    1. Instability of right shoulder joint  M25.311       2. Dislocation of right shoulder joint, initial encounter  S43.004A       3. Postop check  Z09       4. Status post repair of glenoid labrum  Z98.890           Start Time: 1710  Stop Time: 1734  Total time in clinic (min): 24 minutes    Subjective: Pt reports 1-2/10 pain R shld with AAROM exercises.      Objective: See treatment diary below  R shld PROM: 180 deg   R shld abd PROM: 180 deg  R shld ER PROM: 50 deg   R shld IR PROM: 70 deg      Assessment: Tolerated treatment well. Pt with improved R shld PROM. Added cane AAROM exercises and pulleys to POC. Pt able to tolerate with minimal c/o discomfort. Upgraded elbow and wrist exercises to include resistance. Pt would benefit from cont skilled PT to improve RUE strength and stability for return to PLOF.      Plan: Progress treament per protocol.      Precautions: follow Mariel post-op protocol for shoulder labrum repair (attached to pt's chart)  History reviewed. No pertinent past medical history.  No Known Allergies      Manuals  9/3    PROM R shld *follow precautions  15' 15' 15' 12' 10' 15' 15'                                            Neuro Re-Ed             Pendulums      2x10 sag plane    2x10 frontal plane    2x10 circles 3x10 sag plane    3x10 frontal plane    3x10 circles 3x10 sag plane    3x10 frontal plane    3x10 circles     Cane AAROM flex, abd, ER         2x10 ea                                                                     Ther Ex             Pt education On protocol, precautions, purpose of PT, expected outcomes, HEP 18'    5' review of protocol        Elbow flex/ext AROM  3x10 3x10 3x10 3x10 3x10 3x10 3x10 3x10 5#    Pro/sup AROM  3x10 3x10 3x10 3x10 3x10 3x10 3x10  "3x10 5#    Wrist flex/ext AROM  3x10 3x10 3x10 3x10 3x10 3x10 3x10 3x10 2#    Wrist UD/RD AROM  3x10 3x10 3x10 3x10 3x10 3x10 3x10 3x10 2#    Digiflex  Blue 3x10 Black 3x10 [id number] Black 3x10 Black 3x10  Black 3x10  Black 3x10 Black 3x10    Pulleys         5\" x15 flex and abd    Ther Activity                                       Gait Training                                       Modalities                                                    "

## 2024-09-04 ENCOUNTER — APPOINTMENT (OUTPATIENT)
Dept: PHYSICAL THERAPY | Facility: CLINIC | Age: 24
End: 2024-09-04
Payer: COMMERCIAL

## 2024-09-06 ENCOUNTER — EVALUATION (OUTPATIENT)
Dept: PHYSICAL THERAPY | Facility: CLINIC | Age: 24
End: 2024-09-06
Payer: COMMERCIAL

## 2024-09-06 DIAGNOSIS — M25.311 INSTABILITY OF RIGHT SHOULDER JOINT: Primary | ICD-10-CM

## 2024-09-06 DIAGNOSIS — Z09 POSTOP CHECK: ICD-10-CM

## 2024-09-06 DIAGNOSIS — S43.004A DISLOCATION OF RIGHT SHOULDER JOINT, INITIAL ENCOUNTER: ICD-10-CM

## 2024-09-06 DIAGNOSIS — Z98.890 STATUS POST REPAIR OF GLENOID LABRUM: ICD-10-CM

## 2024-09-06 PROCEDURE — 97110 THERAPEUTIC EXERCISES: CPT | Performed by: PHYSICAL THERAPIST

## 2024-09-06 PROCEDURE — 97140 MANUAL THERAPY 1/> REGIONS: CPT | Performed by: PHYSICAL THERAPIST

## 2024-09-06 PROCEDURE — 97112 NEUROMUSCULAR REEDUCATION: CPT | Performed by: PHYSICAL THERAPIST

## 2024-09-06 NOTE — PROGRESS NOTES
"Daily Note     Today's date: 2024  Patient name: Destin Sims Prepetit  : 2000  MRN: [id number]  Referring provider: Lianna Floyd DO  Dx:   Encounter Diagnosis     ICD-10-CM    1. Instability of right shoulder joint  M25.311       2. Postop check  Z09       3. Status post repair of glenoid labrum  Z98.890       4. Dislocation of right shoulder joint, initial encounter  S43.004A                      Subjective: Pt reports low pain, gradually weaning from sling.    Objective: See treatment diary below    PROM  deg,  deg, ER 45 deg ABD 45 deg, IR 45 deg ABD 80 deg    Progressed AAROM    Assessment: Pt demonstrated full AAROM and PROM per protocol, fair arthrokinematics.    Plan: cont POC. Initiate prone rows without resistance nv.     Precautions: follow attached protocol  Wk 1-3 PROM  Wk 4 ER PROM to 0 deg in scaption, 90 deg FE  Wk 5 ER PROM to 30 deg in scaption, FROM FE  Wk 6 initiate AAROM, progress to AROM, initiate scapular strengthening  Wk 8 ER PROM to 50% uninvolved, initiate rhythmic stabilization, low rows  Wk 10 initiate light strengthening, full PROM  Wk 12 progress strengthening  Wk 16 initiate return-to-sports as appropriate      Manuals 8/5 8/6 8/8 8/20 8/22 8/23 8/28 8/30 9/3 9/6   PROM R shld *follow precautions  15' 15' 15' 12' 10' 15' 15'  10 min                                          Neuro Re-Ed             Pendulums      2x10 sag plane    2x10 frontal plane    2x10 circles 3x10 sag plane    3x10 frontal plane    3x10 circles 3x10 sag plane    3x10 frontal plane    3x10 circles     Cane AAROM flex, abd, ER         2x10 ea    Standing FF AAROM          1x15   Standing ABD AAROM          1x15   Standing FF AAROM ecc lowering          1x15   Standing ABD ecc lowering          1x15   Prone rows with scap retraction             FF wall slides                          Ther Ex             Pulleys FF          5\"x15   Pulleys ABD          5\"x15   Pt education On protocol, " "precautions, purpose of PT, expected outcomes, HEP 18'    5' review of protocol        Elbow flex/ext AROM  3x10 3x10 3x10 3x10 3x10 3x10 3x10 3x10 5#    Pro/sup AROM  3x10 3x10 3x10 3x10 3x10 3x10 3x10 3x10 5#    Wrist flex/ext AROM  3x10 3x10 3x10 3x10 3x10 3x10 3x10 3x10 2#    Wrist UD/RD AROM  3x10 3x10 3x10 3x10 3x10 3x10 3x10 3x10 2#    Digiflex  Blue 3x10 Black 3x10 [id number] Black 3x10 Black 3x10  Black 3x10  Black 3x10 Black 3x10    Pulleys         5\" x15 flex and abd    Ther Activity                                       Gait Training                                       Modalities                                                    "

## 2024-09-11 ENCOUNTER — OFFICE VISIT (OUTPATIENT)
Dept: PHYSICAL THERAPY | Facility: CLINIC | Age: 24
End: 2024-09-11
Payer: COMMERCIAL

## 2024-09-11 DIAGNOSIS — Z09 POSTOP CHECK: ICD-10-CM

## 2024-09-11 DIAGNOSIS — Z98.890 STATUS POST REPAIR OF GLENOID LABRUM: ICD-10-CM

## 2024-09-11 DIAGNOSIS — S43.004A DISLOCATION OF RIGHT SHOULDER JOINT, INITIAL ENCOUNTER: ICD-10-CM

## 2024-09-11 DIAGNOSIS — M25.311 INSTABILITY OF RIGHT SHOULDER JOINT: Primary | ICD-10-CM

## 2024-09-11 PROCEDURE — 97140 MANUAL THERAPY 1/> REGIONS: CPT

## 2024-09-11 PROCEDURE — 97110 THERAPEUTIC EXERCISES: CPT

## 2024-09-11 PROCEDURE — 97112 NEUROMUSCULAR REEDUCATION: CPT

## 2024-09-11 NOTE — PROGRESS NOTES
"Daily Note     Today's date: 2024  Patient name: Destin Sims Prepetit  : 2000  MRN: [id number]  Referring provider: Lianna Floyd DO  Dx:   Encounter Diagnosis     ICD-10-CM    1. Instability of right shoulder joint  M25.311       2. Postop check  Z09       3. Status post repair of glenoid labrum  Z98.890       4. Dislocation of right shoulder joint, initial encounter  S43.004A           Start Time: 1202  Stop Time: 1239  Total time in clinic (min): 37 minutes    Subjective: \"I am feeling good today, no pain. I was stretched well last visit\"      Objective: See treatment diary below      Assessment: Tolerated treatment well. Progressed shoulder strengthening today by adding in resisted rows, ER & IR. Pt challenged by muscle fatigue with no report of increased pain. Prone rows added today with emphasis on scapular retraction. Pt reported no pain after the session and was feeling \"really good\".  Patient demonstrated fatigue post treatment, exhibited good technique with therapeutic exercises, and would benefit from continued PT      Plan: Continue per plan of care.  Progress treatment as tolerated.       Precautions: follow attached protocol  Wk 1-3 PROM  Wk 4 ER PROM to 0 deg in scaption, 90 deg FE  Wk 5 ER PROM to 30 deg in scaption, FROM FE  Wk 6 initiate AAROM, progress to AROM, initiate scapular strengthening  Wk 8 ER PROM to 50% uninvolved, initiate rhythmic stabilization, low rows  Wk 10 initiate light strengthening, full PROM  Wk 12 progress strengthening  Wk 16 initiate return-to-sports as appropriate      Manuals 9/11 8/6 8/8 8/20 8/22 8/23 8/28 8/30 9/3 9/6   PROM R shld *follow precautions 8' 15' 15' 15' 12' 10' 15' 15'  10 min                                          Neuro Re-Ed             Pendulums      2x10 sag plane    2x10 frontal plane    2x10 circles 3x10 sag plane    3x10 frontal plane    3x10 circles 3x10 sag plane    3x10 frontal plane    3x10 circles     Cane AAROM flex, abd, " "ER         2x10 ea    Standing FF AAROM 1x15         1x15   Standing ABD AAROM 1x15         1x15   Standing FF AAROM ecc lowering 1x15         1x15   Standing ABD ecc lowering 1x15         1x15   Prone rows with scap retraction 2x10            FF wall slides             Serratus punch 15x            TB IR/ER Grn 2x10 ea            TB rows Blk 2x10            Ther Ex             Pulleys FF 5\"15x         5\"x15   Pulleys ABD 5\"15x         5\"x15   Pt education     5' review of protocol        Elbow flex/ext AROM  3x10 3x10 3x10 3x10 3x10 3x10 3x10 3x10 5#    Pro/sup AROM  3x10 3x10 3x10 3x10 3x10 3x10 3x10 3x10 5#    Wrist flex/ext AROM  3x10 3x10 3x10 3x10 3x10 3x10 3x10 3x10 2#    Wrist UD/RD AROM  3x10 3x10 3x10 3x10 3x10 3x10 3x10 3x10 2#    Digiflex  Blue 3x10 Black 3x10 [id number] Black 3x10 Black 3x10  Black 3x10  Black 3x10 Black 3x10    Pulleys         5\" x15 flex and abd    Ther Activity                                       Gait Training                                       Modalities                                                      "

## 2024-09-12 ENCOUNTER — OFFICE VISIT (OUTPATIENT)
Dept: OBGYN CLINIC | Facility: CLINIC | Age: 24
End: 2024-09-12

## 2024-09-12 VITALS
WEIGHT: 187 LBS | HEART RATE: 70 BPM | SYSTOLIC BLOOD PRESSURE: 123 MMHG | DIASTOLIC BLOOD PRESSURE: 74 MMHG | HEIGHT: 72 IN | BODY MASS INDEX: 25.33 KG/M2

## 2024-09-12 DIAGNOSIS — Z47.89 AFTERCARE FOLLOWING SURGERY OF THE MUSCULOSKELETAL SYSTEM: Primary | ICD-10-CM

## 2024-09-12 PROCEDURE — 99024 POSTOP FOLLOW-UP VISIT: CPT | Performed by: ORTHOPAEDIC SURGERY

## 2024-09-12 NOTE — LETTER
September 12, 2024     Patient: Destin Melo  YOB: 2000  Date of Visit: 9/12/2024      To Whom it May Concern:    Destin Melo is under my professional care. Destin was seen in my office on 9/12/2024. Destin may return to work no sooner than 6 weeks from today (10/24).    If you have any questions or concerns, please don't hesitate to call.         Sincerely,          Lianna Floyd DO        CC: No Recipients

## 2024-09-12 NOTE — PROGRESS NOTES
[facility] [address] [address] [phone]       Destin Sims [facility]  [id number]  2000    ORTHOPAEDIC SURGERY OUTPATIENT NOTE  9/12/2024      HISTORY:  24 y.o. male  presenting s/p right arthroscopic shoulder labral repair on 7/26/24. Pt continues to go to physical therapy now 2x/week down from 3x/week. He continues making improvement with ROM and strength. Patient notes that he has minimal pain, rated 3/10 at the worst. He says he feels 65-70% compared to his left shoulder.      History reviewed. No pertinent past medical history.    Past Surgical History:   Procedure Laterality Date    FL INJECTION RIGHT SHOULDER (ARTHROGRAM)  6/17/2024    SD SURGICAL ARTHROSCOPY SHOULDER CAPSULORRHAPHY Right 7/26/2024    Procedure: ARTHROSCOPY SHOULDER- right with labral repair, possble remplissage and all necessary procedures;  Surgeon: Lianna Floyd DO;  Location:  MAIN OR;  Service: Orthopedics       Social History     Socioeconomic History    Marital status: /Civil Union     Spouse name: Not on file    Number of children: Not on file    Years of education: Not on file    Highest education level: Not on file   Occupational History    Not on file   Tobacco Use    Smoking status: Never    Smokeless tobacco: Never   Vaping Use    Vaping status: Never Used   Substance and Sexual Activity    Alcohol use: Yes     Comment: social    Drug use: Never    Sexual activity: Not on file   Other Topics Concern    Not on file   Social History Narrative    Not on file     Social Determinants of Health     Financial Resource Strain: Not on file   Food Insecurity: Not on file   Transportation Needs: Not on file   Physical Activity: Not on file   Stress: Not on file   Social Connections: Unknown (6/18/2024)    Received from [facility]     How often do you feel lonely or isolated from those around you? (Adult -  for ages 18 years and over): Not on file   Intimate Partner Violence: Not on file   Housing Stability: Not on file       Family History   Problem Relation Age of Onset    No Known Problems Mother     No Known Problems Father         Patient's Medications    No medications on file       No Known Allergies     /74 (BP Location: Left arm, Patient Position: Sitting, Cuff Size: Standard)   Pulse 70   Ht 6' (1.829 m)   Wt 84.8 kg (187 lb)   BMI 25.36 kg/m²      REVIEW OF SYSTEMS:  Constitutional: Negative.    HEENT: Negative.    Respiratory: Negative.    Skin: Negative.    Neurological: Negative.    Psychiatric/Behavioral: Negative.  Musculoskeletal: Negative except for that mentioned in the HPI.      PHYSICAL EXAM:    RIGHT SHOULDER:    Appearance: well healed incisions    Forward flexion:   180 degrees   Abduction:  180 degrees   External rotation at 90 degrees abduction:   90 degrees   Internal rotation at 90 degrees abduction:  90 degrees   External rotation at 0 degrees:   70 degrees   Internal rotation: T7     STRENGTH:  Forward flexion:  5/5   Abduction:  5/5   External rotation:  4/5   Internal rotation:  5/5         Radial/median/ulnar nerve intact    <2 sec cap refill       IMAGING:  no new imaging    ASSESSMENT AND PLAN:  24 y.o. male  s/p right arthroscopic shoulder labral repair 7/26/24. He is continuing to make improvement with PT in terms of ROM and strength. Discussed with him that he will need at least another 6 weeks of recovery before considering returning to work as a .    Scribe Attestation      I,:  Praveen Washburn PA-C am acting as a scribe while in the presence of the attending physician.:       I,:  Lianna Floyd DO personally performed the services described in this documentation    as scribed in my presence.:

## 2024-09-13 ENCOUNTER — OFFICE VISIT (OUTPATIENT)
Dept: PHYSICAL THERAPY | Facility: CLINIC | Age: 24
End: 2024-09-13
Payer: COMMERCIAL

## 2024-09-13 DIAGNOSIS — Z09 POSTOP CHECK: ICD-10-CM

## 2024-09-13 DIAGNOSIS — M25.311 INSTABILITY OF RIGHT SHOULDER JOINT: Primary | ICD-10-CM

## 2024-09-13 DIAGNOSIS — S43.004A DISLOCATION OF RIGHT SHOULDER JOINT, INITIAL ENCOUNTER: ICD-10-CM

## 2024-09-13 DIAGNOSIS — Z98.890 STATUS POST REPAIR OF GLENOID LABRUM: ICD-10-CM

## 2024-09-13 PROCEDURE — 97112 NEUROMUSCULAR REEDUCATION: CPT | Performed by: PHYSICAL THERAPIST

## 2024-09-13 PROCEDURE — 97140 MANUAL THERAPY 1/> REGIONS: CPT | Performed by: PHYSICAL THERAPIST

## 2024-09-13 PROCEDURE — 97110 THERAPEUTIC EXERCISES: CPT | Performed by: PHYSICAL THERAPIST

## 2024-09-13 NOTE — PROGRESS NOTES
"Daily Note     Today's date: 2024  Patient name: Destin Sims Prepetit  : 2000  MRN: [id number]  Referring provider: Lianna Floyd DO  Dx:   Encounter Diagnosis     ICD-10-CM    1. Instability of right shoulder joint  M25.311       2. Postop check  Z09       3. Dislocation of right shoulder joint, initial encounter  S43.004A       4. Status post repair of glenoid labrum  Z98.890                      Subjective: Pt reports good f/u with surgeon, instructed to continue strengthening to prepare for RTW in 6 wks. He is employed as a .    Objective: See treatment diary below    Added wall slides, prone I's, prone T's to POC    Assessment: Pt demonstrated full PROM, no pain and mod fatigue with stabilization.    Plan: Continue per plan of care.  Progress treatment as tolerated.       Precautions: initiate strengthening per MD    Dx: s/p SARS, anterior labrum repair 24    Manuals            PROM R shld *follow precautions 8' 8 min                                                  Neuro Re-Ed                                       Standing FF AAROM 1x15            Standing ABD AAROM 1x15            Standing FF AAROM ecc lowering 1x15 3x10 1#/          Standing ABD ecc lowering 1x15 3x10 1#/          Prone rows with scap retraction 2x10 3x15           Prone I's  3x10           Prone T's  2x10           FF wall slides   3x10           Serratus punch 15x            TB IR/ER Grn 2x10 ea            TB rows Blk 2x10            Ther Ex             Pulleys FF 5\"15x            Pulleys ABD 5\"15x            B TB rows  Black  3x15           TB ER  GTB  3x10                                                                            Ther Activity                                       Gait Training                                       Modalities                                                      "

## 2024-09-18 ENCOUNTER — OFFICE VISIT (OUTPATIENT)
Dept: PHYSICAL THERAPY | Facility: CLINIC | Age: 24
End: 2024-09-18
Payer: COMMERCIAL

## 2024-09-18 DIAGNOSIS — Z98.890 STATUS POST REPAIR OF GLENOID LABRUM: ICD-10-CM

## 2024-09-18 DIAGNOSIS — S43.004A DISLOCATION OF RIGHT SHOULDER JOINT, INITIAL ENCOUNTER: ICD-10-CM

## 2024-09-18 DIAGNOSIS — Z09 POSTOP CHECK: ICD-10-CM

## 2024-09-18 DIAGNOSIS — M25.311 INSTABILITY OF RIGHT SHOULDER JOINT: Primary | ICD-10-CM

## 2024-09-18 PROCEDURE — 97112 NEUROMUSCULAR REEDUCATION: CPT

## 2024-09-18 PROCEDURE — 97110 THERAPEUTIC EXERCISES: CPT

## 2024-09-18 PROCEDURE — 97140 MANUAL THERAPY 1/> REGIONS: CPT

## 2024-09-18 NOTE — PROGRESS NOTES
"Daily Note     Today's date: 2024  Patient name: Destin Sims Prepetit  : 2000  MRN: [id number]  Referring provider: Lianna Floyd DO  Dx:   Encounter Diagnosis     ICD-10-CM    1. Instability of right shoulder joint  M25.311       2. Postop check  Z09       3. Dislocation of right shoulder joint, initial encounter  S43.004A       4. Status post repair of glenoid labrum  Z98.890                      Subjective: Pt reports no shoulder pain.     Objective: See treatment diary below    Assessment: Pt was able to progress scapular stabilization and RTC strengthening as per flow sheet. Pt is progressing towards goals and demonstrates good shoulder arthrokinematics with AAROM.     Plan: Continue per plan of care.  Progress treatment as tolerated.       Precautions: initiate strengthening per MD    Dx: s/p SARS, anterior labrum repair 24    Manuals           PROM R shld *follow precautions 8' 8 min 8 min                                                 Neuro Re-Ed                                       Standing FF AAROM 1x15            Standing ABD AAROM 1x15            Standing FF AAROM ecc lowering 1x15 3x10 1#/ 3x10          Standing ABD ecc lowering 1x15 3x10 1#/ 3x10          Prone rows with scap retraction 2x10 3x15 3x15          Prone I's  3x10 3x12          Prone T's  2x10 2x10          FF wall slides   3x10 3x10          Serratus punch 15x            TB IR/ER Grn 2x10 ea            TB rows Blk 2x10            Ther Ex             Pulleys FF 5\"15x            Pulleys ABD 5\"15x            B TB rows  Black  3x15 Black  3x15 Silver  3x15         TB ER  GTB  3x10 GTB  3x12                                                                           Ther Activity                                       Gait Training                                       Modalities                                                      "

## 2024-09-20 ENCOUNTER — OFFICE VISIT (OUTPATIENT)
Dept: PHYSICAL THERAPY | Facility: CLINIC | Age: 24
End: 2024-09-20
Payer: COMMERCIAL

## 2024-09-20 DIAGNOSIS — Z98.890 STATUS POST REPAIR OF GLENOID LABRUM: ICD-10-CM

## 2024-09-20 DIAGNOSIS — M25.311 INSTABILITY OF RIGHT SHOULDER JOINT: Primary | ICD-10-CM

## 2024-09-20 DIAGNOSIS — S43.004A DISLOCATION OF RIGHT SHOULDER JOINT, INITIAL ENCOUNTER: ICD-10-CM

## 2024-09-20 DIAGNOSIS — Z09 POSTOP CHECK: ICD-10-CM

## 2024-09-20 PROCEDURE — 97112 NEUROMUSCULAR REEDUCATION: CPT

## 2024-09-20 PROCEDURE — 97140 MANUAL THERAPY 1/> REGIONS: CPT

## 2024-09-20 PROCEDURE — 97110 THERAPEUTIC EXERCISES: CPT

## 2024-09-20 NOTE — PROGRESS NOTES
"Daily Note     Today's date: 2024  Patient name: Destin Sims Prepetit  : 2000  MRN: [id number]  Referring provider: Lianna Floyd DO  Dx:   Encounter Diagnosis     ICD-10-CM    1. Instability of right shoulder joint  M25.311       2. Postop check  Z09       3. Dislocation of right shoulder joint, initial encounter  S43.004A       4. Status post repair of glenoid labrum  Z98.890             Start Time: 1030  Stop Time: 1110  Total time in clinic (min): 40 minutes    Subjective: Patient reports no soreness following exercises. Continues to have no pain.     Objective: See treatment diary below    Assessment: Patient's PROM is WNL. Pt is progressing really well towards goals and demonstrated good shoulder arthrokinematics with AAROM.     Plan: Continue per plan of care.  Progress treatment as tolerated.       Precautions: initiate strengthening per MD    Dx: s/p SARS, anterior labrum repair 24    Manuals          PROM R shld *follow precautions 8' 8 min 8 min 8 min                                                Neuro Re-Ed                                       Standing FF AAROM 1x15            Standing ABD AAROM 1x15            Standing FF AAROM ecc lowering 1x15 3x10 1#/ 3x10 1#  3x10         Standing ABD ecc lowering 1x15 3x10 1#/ 3x10 1#  3x10         Prone rows with scap retraction 2x10 3x15 3x15 3x15         Prone I's  3x10 3x12 3x15         Prone T's  2x10 2x10 3x10         FF wall slides   3x10 3x10 3x10         Serratus punch 15x            TB IR/ER Grn 2x10 ea            TB rows Blk 2x10            Ther Ex             Pulleys FF 5\"15x            Pulleys ABD 5\"15x            B TB rows  Black  3x15 Black  3x15 Silver  3x15         TB ER  GTB  3x10 GTB  3x12 GTB  3x12                                                                          Ther Activity                                       Gait Training                                       Modalities               "

## 2024-09-25 ENCOUNTER — OFFICE VISIT (OUTPATIENT)
Dept: PHYSICAL THERAPY | Facility: CLINIC | Age: 24
End: 2024-09-25
Payer: COMMERCIAL

## 2024-09-25 DIAGNOSIS — Z98.890 STATUS POST REPAIR OF GLENOID LABRUM: ICD-10-CM

## 2024-09-25 DIAGNOSIS — M25.311 INSTABILITY OF RIGHT SHOULDER JOINT: Primary | ICD-10-CM

## 2024-09-25 PROCEDURE — 97110 THERAPEUTIC EXERCISES: CPT

## 2024-09-25 NOTE — PROGRESS NOTES
"Daily Note     Today's date: 2024  Patient name: Destin Sims Prepetit  : 2000  MRN: [id number]  Referring provider: Lianna Floyd DO  Dx:   Encounter Diagnosis     ICD-10-CM    1. Instability of right shoulder joint  M25.311       2. Status post repair of glenoid labrum  Z98.890                        Subjective: Patient reports no soreness following exercises. Continues to have no pain.     Objective: See treatment diary below    Assessment: Held PROM as is WNL and no longer feels stretch w/ this. Able to progress resistance for several strength exercises w/o increase in pain/soreness. Pt is progressing really well towards goals and demonstrated good shoulder arthrokinematics with AROM.     Plan: Continue per plan of care.  Progress treatment as tolerated.       Precautions: initiate strengthening per MD    Dx: s/p SARS, anterior labrum repair 24    Manuals         PROM R shld *follow precautions 8' 8 min 8 min 8 min                                                Neuro Re-Ed                                       Standing FF AAROM 1x15            Standing ABD AAROM 1x15            Standing FF AAROM ecc lowering 1x15 3x10 1#/ 3x10 1#  3x10 3# 2x12        Standing ABD ecc lowering 1x15 3x10 1#/ 3x10 1#  3x10 3# 2x12        Prone rows with scap retraction 2x10 3x15 3x15 3x15 3# 3x12        Prone I's  3x10 3x12 3x15 3x15        Prone T's  2x10 2x10 3x10 3x15        FF wall slides   3x10 3x10 3x10 5\" 3x10        Serratus punch 15x            TB IR/ER Grn 2x10 ea            TB rows Blk 2x10            Ther Ex             Pulleys FF 5\"15x            Pulleys ABD 5\"15x            B TB rows  Black  3x15 Black  3x15 Silver  3x15 Silver 3x18        TB ER  GTB  3x10 GTB  3x12 GTB  3x12 BTB 3x12                                                                         Ther Activity                                       Gait Training                                     "   Modalities

## 2024-09-26 ENCOUNTER — OFFICE VISIT (OUTPATIENT)
Dept: PHYSICAL THERAPY | Facility: CLINIC | Age: 24
End: 2024-09-26
Payer: COMMERCIAL

## 2024-09-26 DIAGNOSIS — S43.004A DISLOCATION OF RIGHT SHOULDER JOINT, INITIAL ENCOUNTER: ICD-10-CM

## 2024-09-26 DIAGNOSIS — Z98.890 STATUS POST REPAIR OF GLENOID LABRUM: ICD-10-CM

## 2024-09-26 DIAGNOSIS — M25.311 INSTABILITY OF RIGHT SHOULDER JOINT: Primary | ICD-10-CM

## 2024-09-26 DIAGNOSIS — Z09 POSTOP CHECK: ICD-10-CM

## 2024-09-26 PROCEDURE — 97140 MANUAL THERAPY 1/> REGIONS: CPT

## 2024-09-26 PROCEDURE — 97112 NEUROMUSCULAR REEDUCATION: CPT

## 2024-09-26 PROCEDURE — 97110 THERAPEUTIC EXERCISES: CPT

## 2024-09-26 NOTE — PROGRESS NOTES
"Daily Note     Today's date: 2024  Patient name: Destin Vidaletit  : 2000  MRN: [id number]  Referring provider: Lianna Floyd DO  Dx:   Encounter Diagnosis     ICD-10-CM    1. Instability of right shoulder joint  M25.311       2. Status post repair of glenoid labrum  Z98.890       3. Postop check  Z09       4. Dislocation of right shoulder joint, initial encounter  S43.004A           Start Time: 0910  Stop Time: 0950  Total time in clinic (min): 40 minutes    Subjective: Patient reports feeling stronger each visit.     Objective: See treatment diary below    Assessment: Pt is progressing really well towards goals and demonstrated good shoulder arthrokinematics with AROM.     Plan: Continue per plan of care.  Progress treatment as tolerated.       Precautions: initiate strengthening per MD    Dx: s/p SARS, anterior labrum repair 24    Manuals        PROM R shld *follow precautions 8' 8 min 8 min 8 min  8 min                                              Neuro Re-Ed                                       Standing FF AAROM 1x15            Standing ABD AAROM 1x15            Standing FF AAROM ecc lowering 1x15 3x10 1#/ 3x10 1#  3x10 3# 2x12 3# 3x15       Standing ABD ecc lowering 1x15 3x10 1#/ 3x10 1#  3x10 3# 2x12 3#  3x15       Prone rows with scap retraction 2x10 3x15 3x15 3x15 3# 3x12 3#  3x15       Prone I's  3x10 3x12 3x15 3x15 3x15       Prone T's  2x10 2x10 3x10 3x15 3x15       FF wall slides   3x10 3x10 3x10 5\" 3x10 3x10       Serratus punch 15x            TB IR/ER Grn 2x10 ea            TB rows Blk 2x10            Ther Ex             Pulleys FF 5\"15x            Pulleys ABD 5\"15x            B TB rows  Black  3x15 Black  3x15 Silver  3x15 Silver 3x18 Silver  3x18       TB ER  GTB  3x10 GTB  3x12 GTB  3x12 BTB 3x12 Blue  3x12                                                                        Ther Activity                                       Gait " Training                                       Modalities

## 2024-10-04 ENCOUNTER — OFFICE VISIT (OUTPATIENT)
Dept: PHYSICAL THERAPY | Facility: CLINIC | Age: 24
End: 2024-10-04
Payer: COMMERCIAL

## 2024-10-04 DIAGNOSIS — Z98.890 STATUS POST REPAIR OF GLENOID LABRUM: ICD-10-CM

## 2024-10-04 DIAGNOSIS — M25.311 INSTABILITY OF RIGHT SHOULDER JOINT: Primary | ICD-10-CM

## 2024-10-04 PROCEDURE — 97110 THERAPEUTIC EXERCISES: CPT | Performed by: PHYSICAL THERAPIST

## 2024-10-04 PROCEDURE — 97112 NEUROMUSCULAR REEDUCATION: CPT | Performed by: PHYSICAL THERAPIST

## 2024-10-04 NOTE — PROGRESS NOTES
"Daily Note     Today's date: 10/4/2024  Patient name: Destin Sims Prepetit  : 2000  MRN: [id number]  Referring provider: Lianna Floyd DO  Dx:   Encounter Diagnosis     ICD-10-CM    1. Instability of right shoulder joint  M25.311       2. Status post repair of glenoid labrum  Z98.890                      Subjective: Pt reports no pain or feelings of instability. He will return to work in approx 3 more weeks.    Objective: See treatment diary below    Assessment: Pt demonstrated full, pain-free PROM in all planes and moderate B scapular winging medial border when fatigued with CKC > OKC scap stab.    Plan: Cont POC.     Precautions: initiate strengthening per MD    Dx: s/p SARS, anterior labrum repair 24    Manuals  10      PROM R shld *follow precautions 8' 8 min 8 min 8 min  8 min held                                             Neuro Re-Ed                                       Standing FF AAROM 1x15            Standing ABD AAROM 1x15            Standing FF AAROM ecc lowering 1x15 3x10 1#/ 3x10 1#  3x10 3# 2x12 3# 3x15 3#  3x15      Standing ABD ecc lowering 1x15 3x10 1#/ 3x10 1#  3x10 3# 2x12 3#  3x15 3#  3x15      Prone rows with scap retraction 2x10 3x15 3x15 3x15 3# 3x12 3#  3x15       Prone I's  3x10 3x12 3x15 3x15 3x15 2#  3x15      Prone T's  2x10 2x10 3x10 3x15 3x15 1#  3x15      Prone ER       3x10      FF wall slides   3x10 3x10 3x10 5\" 3x10 3x10 RTB  3x10      Push-ups plus only       Low table  3x10      Serratus punch 15x            TB IR/ER Grn 2x10 ea            TB rows Blk 2x10            Ther Ex             Pulleys FF 5\"15x            Pulleys ABD 5\"15x            B TB rows  Black  3x15 Black  3x15 Silver  3x15 Silver 3x18 Silver  3x18 Gold  3x15      TB ER  GTB  3x10 GTB  3x12 GTB  3x12 BTB 3x12 Blue  3x12 Blue  3x20      Front raises        3#  3x10     Lateral raises        3#  3x10                                            Ther Activity     "                                   Gait Training                                       Modalities

## 2024-10-21 ENCOUNTER — OFFICE VISIT (OUTPATIENT)
Dept: PHYSICAL THERAPY | Facility: CLINIC | Age: 24
End: 2024-10-21
Payer: COMMERCIAL

## 2024-10-21 DIAGNOSIS — M25.311 INSTABILITY OF RIGHT SHOULDER JOINT: Primary | ICD-10-CM

## 2024-10-21 DIAGNOSIS — Z09 POSTOP CHECK: ICD-10-CM

## 2024-10-21 DIAGNOSIS — Z98.890 STATUS POST REPAIR OF GLENOID LABRUM: ICD-10-CM

## 2024-10-21 DIAGNOSIS — S43.004A DISLOCATION OF RIGHT SHOULDER JOINT, INITIAL ENCOUNTER: ICD-10-CM

## 2024-10-21 PROCEDURE — 97112 NEUROMUSCULAR REEDUCATION: CPT

## 2024-10-21 NOTE — PROGRESS NOTES
"Daily Note     Today's date: 10/21/2024  Patient name: Destin Sims Prepetit  : 2000  MRN: [id number]  Referring provider: Lianna Floyd DO  Dx:   Encounter Diagnosis     ICD-10-CM    1. Instability of right shoulder joint  M25.311       2. Status post repair of glenoid labrum  Z98.890       3. Postop check  Z09       4. Dislocation of right shoulder joint, initial encounter  S43.004A             Start Time: 1043  Stop Time: 1115  Total time in clinic (min): 32 minutes    Subjective: Patient reports that he is not symptomatic today and states that his shoulder is \"feeling really good lately\" and denies any exacerbations of sxs lately.    Objective: See treatment diary below    Assessment: Tolerated treatment well today. Added fwd and lateral raises as per primary PT, with no adverse response or worsening of sxs noted throughout exercises. Muscle fatigue present at the conclusion of session, but no increases in sxs post treatment. Patient would benefit from continued PT to address remaining R shoulder deficits in order to maximize overall functional ability.     Plan: Cont POC.     Precautions: initiate strengthening per MD    Dx: s/p SARS, anterior labrum repair 24    Manuals 9/11 9/13 9/18 9/20 9/25 9/26 10/4 10/21     PROM R shld *follow precautions 8' 8 min 8 min 8 min  8 min held                                             Neuro Re-Ed                                       Standing FF AAROM 1x15            Standing ABD AAROM 1x15            Standing FF AAROM ecc lowering 1x15 3x10 1#/ 3x10 1#  3x10 3# 2x12 3# 3x15 3#  3x15 3# 3x15     Standing ABD ecc lowering 1x15 3x10 1#/ 3x10 1#  3x10 3# 2x12 3#  3x15 3#  3x15 3# 3x15     Prone rows with scap retraction 2x10 3x15 3x15 3x15 3# 3x12 3#  3x15  3# 3x15     Prone I's  3x10 3x12 3x15 3x15 3x15 2#  3x15 2# 3x15     Prone T's  2x10 2x10 3x10 3x15 3x15 1#  3x15 1# 3x15     Prone ER       3x10 3x10     FF wall slides   3x10 3x10 3x10 5\" 3x10 3x10 " "RTB  3x10 RTB 3x10     Push-ups plus only       Low table  3x10 Low table 3x10     Serratus punch 15x            TB IR/ER Grn 2x10 ea            TB rows Blk 2x10            Ther Ex             Pulleys FF 5\"15x            Pulleys ABD 5\"15x            B TB rows  Black  3x15 Black  3x15 Silver  3x15 Silver 3x18 Silver  3x18 Gold  3x15 Gold 3x15     TB ER  GTB  3x10 GTB  3x12 GTB  3x12 BTB 3x12 Blue  3x12 Blue  3x20 Blue 3x20     Front raises        3#  3x10     Lateral raises        3#  3x10                                            Ther Activity                                       Gait Training                                       Modalities                                          "

## 2024-10-23 ENCOUNTER — OFFICE VISIT (OUTPATIENT)
Dept: OBGYN CLINIC | Facility: CLINIC | Age: 24
End: 2024-10-23

## 2024-10-23 ENCOUNTER — OFFICE VISIT (OUTPATIENT)
Dept: PHYSICAL THERAPY | Facility: CLINIC | Age: 24
End: 2024-10-23
Payer: COMMERCIAL

## 2024-10-23 VITALS — WEIGHT: 187 LBS | HEIGHT: 72 IN | BODY MASS INDEX: 25.33 KG/M2

## 2024-10-23 DIAGNOSIS — Z47.89 AFTERCARE FOLLOWING SURGERY OF THE MUSCULOSKELETAL SYSTEM: Primary | ICD-10-CM

## 2024-10-23 DIAGNOSIS — Z98.890 STATUS POST REPAIR OF GLENOID LABRUM: ICD-10-CM

## 2024-10-23 DIAGNOSIS — M25.311 INSTABILITY OF RIGHT SHOULDER JOINT: Primary | ICD-10-CM

## 2024-10-23 PROCEDURE — 97112 NEUROMUSCULAR REEDUCATION: CPT | Performed by: PHYSICAL THERAPIST

## 2024-10-23 PROCEDURE — 99024 POSTOP FOLLOW-UP VISIT: CPT | Performed by: ORTHOPAEDIC SURGERY

## 2024-10-23 PROCEDURE — 97110 THERAPEUTIC EXERCISES: CPT | Performed by: PHYSICAL THERAPIST

## 2024-10-23 NOTE — PROGRESS NOTES
"Daily Note     Today's date: 10/23/2024  Patient name: Destin Sims Prepetit  : 2000  MRN: [id number]  Referring provider: Lianna Floyd DO  Dx:   Encounter Diagnosis     ICD-10-CM    1. Instability of right shoulder joint  M25.311       2. Status post repair of glenoid labrum  Z98.890                        Subjective: Pt reports no pain, now cleared for modified RTW tomorrow (no lifting > 25 lbs, no OH work)    Objective: See treatment diary below    Assessment: Pt demonstrated mild difficulty limiting UT dominant strategy for scap stab.    Plan: Cont POC.     Precautions: initiate strengthening per MD    Dx: s/p SARS, anterior labrum repair 24    Manuals 9/11 9/13 9/18 9/20 9/25 9/26 10/4 10/21 10/23    PROM R shld *follow precautions 8' 8 min 8 min 8 min  8 min held                                             Neuro Re-Ed                                       Standing FF AAROM 1x15            Standing ABD AAROM 1x15            Standing FF AAROM ecc lowering 1x15 3x10 1#/ 3x10 1#  3x10 3# 2x12 3# 3x15 3#  3x15 3# 3x15 3#  3x15    Standing ABD ecc lowering 1x15 3x10 1#/ 3x10 1#  3x10 3# 2x12 3#  3x15 3#  3x15 3# 3x15 3#  3x15    Prone rows with scap retraction 2x10 3x15 3x15 3x15 3# 3x12 3#  3x15  3# 3x15     Prone I's  3x10 3x12 3x15 3x15 3x15 2#  3x15 2# 3x15 2#  3x10    Prone T's  2x10 2x10 3x10 3x15 3x15 1#  3x15 1# 3x15 1#  3x20    Prone Y's         3x10    Prone ER       3x10 3x10     FF wall slides   3x10 3x10 3x10 5\" 3x10 3x10 RTB  3x10 RTB 3x10 RTB  3x15    Push-ups plus only       Low table  3x10 Low table 3x10 Low table  5x10    Serratus punch 15x            TB IR/ER Grn 2x10 ea            TB rows Blk 2x10            Ther Ex             Pulleys FF 5\"15x            Pulleys ABD 5\"15x            B TB rows  Black  3x15 Black  3x15 Silver  3x15 Silver 3x18 Silver  3x18 Gold  3x15 Gold 3x15     TB ER  GTB  3x10 GTB  3x12 GTB  3x12 BTB 3x12 Blue  3x12 Blue  3x20 Blue 3x20     TB ER 90/90  "        Red  3x10    Front raises        3#  3x10 4#  3x10    Lateral raises        3#  3x10 4#  3x10                                           Ther Activity                                       Gait Training                                       Modalities

## 2024-10-23 NOTE — PROGRESS NOTES
[facility] [address] [id number] [phone]       Destin Sims [facility]  [id number]  2000    ORTHOPAEDIC SURGERY OUTPATIENT NOTE  10/23/2024      HISTORY:  24 y.o. male presents for postop visit status post right shoulder arthroscopy with labral pair 7/26/2024.  Patient is doing very well.  SANE score 85%.  No pain.  He has been compliant with therapy and continues to progress with strengthening.  Like to return to work as a .    History reviewed. No pertinent past medical history.    Past Surgical History:   Procedure Laterality Date    FL INJECTION RIGHT SHOULDER (ARTHROGRAM)  6/17/2024    [place] SURGICAL ARTHROSCOPY SHOULDER CAPSULORRHAPHY Right 7/26/2024    Procedure: ARTHROSCOPY SHOULDER- right with labral repair, possble remplissage and all necessary procedures;  Surgeon: Lianna Floyd DO;  Location:  MAIN OR;  Service: Orthopedics       Social History     Socioeconomic History    Marital status: /Civil Union     Spouse name: Not on file    Number of children: Not on file    Years of education: Not on file    Highest education level: Not on file   Occupational History    Not on file   Tobacco Use    Smoking status: Never    Smokeless tobacco: Never   Vaping Use    Vaping status: Never Used   Substance and Sexual Activity    Alcohol use: Yes     Comment: social    Drug use: Never    Sexual activity: Not on file   Other Topics Concern    Not on file   Social History Narrative    Not on file     Social Determinants of Health     Financial Resource Strain: Not on file   Food Insecurity: Not on file   Transportation Needs: Not on file   Physical Activity: Not on file   Stress: Not on file   Social Connections: Unknown (6/18/2024)    Received from [employer]     How often do you feel lonely or isolated from those around you? (Adult - for ages 18 years and over): Not on file    Intimate Partner Violence: Not on file   Housing Stability: Not on file       Family History   Problem Relation Age of Onset    No Known Problems Mother     No Known Problems Father         Patient's Medications    No medications on file       No Known Allergies     Ht 6' (1.829 m)   Wt 84.8 kg (187 lb)   BMI 25.36 kg/m²      REVIEW OF SYSTEMS:  Constitutional: Negative.    HEENT: Negative.    Respiratory: Negative.    Skin: Negative.    Neurological: Negative.    Psychiatric/Behavioral: Negative.  Musculoskeletal: Negative except for that mentioned in the HPI.    Gen: No acute distress, resting comfortably in bed  HEENT: Eyes clear, moist mucus membranes, hearing intact  Respiratory: No audible wheezing or stridor  Cardiovascular: Well Perfused peripherally, 2+ distal pulse  Abdomen: nondistended, no peritoneal signs     PHYSICAL EXAM:    RIGHT SHOULDER:    Appearance: well healed incisions    Forward flexion:   180 degrees   Abduction:  90 degrees   External rotation at 90 degrees abduction:   90 degrees   Internal rotation at 90 degrees abduction:  90 degrees   External rotation at 0 degrees:   70 degrees   Internal rotation: T7     STRENGTH:  Forward flexion:  4+/5   Abduction:  4+/5   External rotation:  5/5   Internal rotation:  5/5         Radial/median/ulnar nerve intact    <2 sec cap refill        IMAGING:  None    ASSESSMENT AND PLAN:  24 y.o. male 3-month status post right shoulder arthroscopy with labral repair.  Patient is doing very well.  Therapy and strengthening.  He may return stations.  He was given a note.  Will see him back in 6 weeks.    Scribe Attestation      I,:  Mickey Lutz PA-C am acting as a scribe while in the presence of the attending physician.:       I,:  Lianna Floyd personally performed the services described in this documentation    as scribed in my presence.:

## 2024-10-23 NOTE — LETTER
October 23, 2024     Patient: Destin Melo  YOB: 2000  Date of Visit: 10/23/2024      To Whom it May Concern:    Destin Melo is under my professional care. Destin was seen in my office on 10/23/2024. Destin may return to work on 10/24/24. No overhead work, no lifting over 25 lb.     If you have any questions or concerns, please don't hesitate to call.         Sincerely,          Lianna Floyd DO        CC: No Recipients

## 2024-10-23 NOTE — LETTER
October 23, 2024     Patient: Destin Melo  YOB: 2000  Date of Visit: 10/23/2024      To Whom it May Concern:    Destin Melo is under my professional care. Destin was seen in my office on 10/23/2024. Destin may return to work on 10/24/24 with no restrictions.    If you have any questions or concerns, please don't hesitate to call.         Sincerely,          Lianna Floyd DO        CC: No Recipients

## 2024-12-13 ENCOUNTER — TELEPHONE (OUTPATIENT)
Age: 24
End: 2024-12-13

## 2024-12-13 NOTE — TELEPHONE ENCOUNTER
Received call from patient's wife to [facility] new patient appt.    I offered the next available in 2025.     Wife declined

## 2025-01-23 ENCOUNTER — OFFICE VISIT (OUTPATIENT)
Dept: OBGYN CLINIC | Facility: CLINIC | Age: 25
End: 2025-01-23
Payer: COMMERCIAL

## 2025-01-23 DIAGNOSIS — Z47.89 AFTERCARE FOLLOWING SURGERY OF THE MUSCULOSKELETAL SYSTEM: Primary | ICD-10-CM

## 2025-01-23 PROCEDURE — 99213 OFFICE O/P EST LOW 20 MIN: CPT | Performed by: ORTHOPAEDIC SURGERY

## 2025-03-19 ENCOUNTER — HOSPITAL ENCOUNTER (EMERGENCY)
Facility: HOSPITAL | Age: 25
Discharge: HOME | End: 2025-03-19
Payer: OTHER MISCELLANEOUS

## 2025-03-19 VITALS
TEMPERATURE: 98.2 F | HEART RATE: 78 BPM | RESPIRATION RATE: 16 BRPM | SYSTOLIC BLOOD PRESSURE: 127 MMHG | DIASTOLIC BLOOD PRESSURE: 89 MMHG | OXYGEN SATURATION: 99 %

## 2025-03-19 DIAGNOSIS — M54.6 ACUTE LEFT-SIDED THORACIC BACK PAIN: Primary | ICD-10-CM

## 2025-03-19 PROCEDURE — 99284 EMERGENCY DEPT VISIT MOD MDM: CPT

## 2025-03-19 PROCEDURE — 99283 EMERGENCY DEPT VISIT LOW MDM: CPT

## 2025-03-19 RX ORDER — NAPROXEN 375 MG/1
375 TABLET ORAL 2 TIMES DAILY WITH MEALS
Qty: 20 TABLET | Refills: 0 | Status: SHIPPED | OUTPATIENT
Start: 2025-03-19

## 2025-03-19 NOTE — DISCHARGE INSTRUCTIONS
Return to the Emergency Department sooner if increased pain, numbness, weakness, fever, vomiting, diarrhea, incontinence, difficulty walking or breathing or urinating, rash.     Please utilize the provided anti-inflammatory medication (naproxen) to help with pain control.  Please utilize provided contact information to establish care with our comprehensive spine program for further evaluation and assessment of your back pain.    You may also utilize topical therapies such as lidocaine patches to help with control of your symptomology.    You have been also been provided with contact information to establish care with our orthopedics group for recalcitrant back pain if the anti-inflammatories as well as physical therapy have failed to improve your symptoms.

## 2025-03-19 NOTE — ED PROVIDER NOTES
Time reflects when diagnosis was documented in both MDM as applicable and the Disposition within this note       Time User Action Codes Description Comment    3/19/2025  5:15 PM Aki Orr Add [M54.6] Acute left-sided thoracic back pain           ED Disposition       ED Disposition   Discharge    Condition   Stable    Date/Time   Wed Mar 19, 2025  5:15 PM    Comment   Destin Sims Prepet[facility] discharge to home/self care.                   Assessment & Plan       Medical Decision Making  Patient is a pleasant 24-year-old male presents to the emergency department with an acute exacerbation of thoracic back pain.    DDx including but not limited to: sciatica, herniated disc, arthritis, spinal stenosis, strain, sprain, fracture, cauda equina syndrome, epidural abscess, transverse myelitis, AAA.     Based off initial presenting complaints, patient is having atraumatic back pain with no radicular symptoms or concern for neurological sequelae.  Lower suspicion for cauda equina, epidural abscess or transverse myelitis given his physical examination today.    Patient provided with recommendations on anti-inflammatory medications as well as initiating trial of physical therapy to help with control of symptoms.  Patient provided with contact information to establish care with our comprehensive spine program for further interval assessment and management with our physical therapy team.  Patient also provided with contact information to establish care with an orthopedic provider but was encouraged to first follow-up with her primary care provider and the comprehensive spine program first before reaching out to an orthopedic provider.  Patient in agreement with this plan and clinically stable for discharge and was able to walk out of the emergency department underneath her own strength with no assist device.    Amount and/or Complexity of Data Reviewed  Independent Historian: spouse  External Data Reviewed:  labs.    Risk  Prescription drug management.             Medications - No data to display    ED Risk Strat Scores                                                History of Present Illness       Chief Complaint   Patient presents with    Back Pain     Pt was lifting metal at work yesterday and has been having mid back pain since        History reviewed. No pertinent past medical history.   Past Surgical History:   Procedure Laterality Date    [place] INJECTION RIGHT SHOULDER (ARTHROGRAM)  6/17/2024    [place] SURGICAL ARTHROSCOPY SHOULDER CAPSULORRHAPHY Right 7/26/2024    Procedure: ARTHROSCOPY SHOULDER- right with labral repair, possble remplissage and all necessary procedures;  Surgeon: Lianna Floyd DO;  Location:  MAIN OR;  Service: Orthopedics      Family History   Problem Relation Age of Onset    No Known Problems Mother     No Known Problems Father       Social History     Tobacco Use    Smoking status: Never    Smokeless tobacco: Never   Vaping Use    Vaping status: Never Used   Substance Use Topics    Alcohol use: Yes     Comment: social    Drug use: Never      E-Cigarette/Vaping    E-Cigarette Use Never User       E-Cigarette/Vaping Substances      I have reviewed and agree with the history as documented.     Destin is a very pleasant 24-year-old male presents to the emergency department with left-sided thoracic level back pain after lifting a heavy object at work.  States that this had occurred while holding the heavy item out at arms distance with a sudden seizing sensation in his upper back.  No radiation down into his lower back.  No radiation down either leg.  No radiation into arms.  No traumatic process to the back.  No previous injury to the back that the patient is aware of.  Intermittent utilization of anti-inflammatories and does endorse that today he feels that it is still present but not as severe as the previous day.    No fevers or chills.  No urinary incontinence or bowel incontinence.  No saddle  anesthesia.      History provided by:  Patient      Review of Systems   Constitutional:  Negative for activity change, appetite change, chills and fever.   HENT:  Negative for ear pain and sore throat.    Eyes:  Negative for pain, discharge and visual disturbance.   Respiratory:  Negative for cough and shortness of breath.    Cardiovascular:  Negative for chest pain and palpitations.   Gastrointestinal:  Negative for abdominal pain and vomiting.   Endocrine: Negative for cold intolerance.   Genitourinary:  Negative for dysuria and hematuria.   Musculoskeletal:  Positive for back pain. Negative for arthralgias.   Skin:  Negative for color change and rash.   Neurological:  Negative for seizures and syncope.   All other systems reviewed and are negative.          Objective       ED Triage Vitals [03/19/25 1610]   Temperature Pulse Blood Pressure Respirations SpO2 Patient Position - Orthostatic VS   98.2 °F (36.8 °C) 78 127/89 16 99 % Sitting      Temp src Heart Rate Source BP Location FiO2 (%) Pain Score    -- Monitor Right arm -- --      Vitals      Date and Time Temp Pulse SpO2 Resp BP Pain Score FACES Pain Rating User   03/19/25 1610 98.2 °F (36.8 °C) 78 99 % 16 127/89 -- -- KM            Physical Exam  Vitals and nursing note reviewed.   Constitutional:       Appearance: Normal appearance. He is well-developed. He is not ill-appearing.   HENT:      Head: Normocephalic and atraumatic.      Right Ear: External ear normal.      Left Ear: External ear normal.      Nose: Nose normal. No congestion.      Mouth/Throat:      Mouth: Mucous membranes are moist.      Pharynx: No oropharyngeal exudate or posterior oropharyngeal erythema.   Eyes:      Extraocular Movements: Extraocular movements intact.      Conjunctiva/sclera: Conjunctivae normal.      Pupils: Pupils are equal, round, and reactive to light.   Cardiovascular:      Rate and Rhythm: Normal rate and regular rhythm.      Heart sounds: No murmur heard.  Pulmonary:       Effort: Pulmonary effort is normal. No respiratory distress.      Breath sounds: Normal breath sounds.   Abdominal:      Palpations: Abdomen is soft.      Tenderness: There is no abdominal tenderness.   Musculoskeletal:         General: No swelling, deformity or signs of injury. Normal range of motion.      Cervical back: Normal range of motion and neck supple.      Right lower leg: No edema.      Left lower leg: No edema.      Comments: BACK EXAM:  Gait: normal    BACK TENDERNESS:  Spinous Processes: no  Paraspinal Muscles: no    DERMATOMAL SENSATION:  L1: normal   L2: normal   L3: normal   L4: normal   L5: normal   S1: normal    STRENGTH (bilateral):  Knee Extension: 5/5  Foot Dorsiflexion: 5/5  Great Toe Extension: 5/5  Foot Plantarflexion: 5/5  Hip Flexion: 5/5  Hip Abduction: 5/5    REFLEXES:  Patellar: symmetric   Achilles:symmetric  Clonus: negative     BACK:   SUPINE STRAIGHT LEG: negative  SLUMP: negative      Skin:     General: Skin is warm and dry.      Capillary Refill: Capillary refill takes less than 2 seconds.      Coloration: Skin is not jaundiced.      Findings: No bruising or erythema.   Neurological:      General: No focal deficit present.      Mental Status: He is alert and oriented to person, place, and time.   Psychiatric:         Mood and Affect: Mood normal.         Results Reviewed       None            No orders to display       Procedures    ED Medication and Procedure Management   None     Discharge Medication List as of 3/19/2025  5:21 PM        START taking these medications    Details   naproxen (NAPROSYN) 375 mg tablet Take 1 tablet (375 mg total) by mouth 2 (two) times a day with meals, Starting Wed 3/19/2025, Normal             ED SEPSIS DOCUMENTATION   Time reflects when diagnosis was documented in both MDM as applicable and the Disposition within this note       Time User Action Codes Description Comment    3/19/2025  5:15 PM Aki Orr Add [M54.6] Acute left-sided  thoracic back pain                  Aki Orr MD  03/20/25 0039

## 2025-03-20 ENCOUNTER — TELEPHONE (OUTPATIENT)
Dept: PHYSICAL THERAPY | Facility: OTHER | Age: 25
End: 2025-03-20

## 2025-03-20 NOTE — TELEPHONE ENCOUNTER
Nurse reached out to discuss the recent referral entered for  Comprehensive Spine program.    Unable to leave a message for the patient as his voice MB states it is FULL.    This is the first attempt to reach the patient. Referral deferred for a f/u attempt per protocol.

## (undated) DEVICE — BURR  OVAL 4MM 13CM 8 FLUTE COOLCUT

## (undated) DEVICE — SUT STRATAFIX SPIRAL MONOCRYL PLUS 3-0 PS-2 45CM SXMP1B107

## (undated) DEVICE — GLOVE SRG BIOGEL 7.5

## (undated) DEVICE — TUBING SUCTION 5MM X 12 FT

## (undated) DEVICE — DRESSING MEPILEX AG BORDER POST-OP 4 X 8 IN

## (undated) DEVICE — MAYO STAND COVER: Brand: CONVERTORS

## (undated) DEVICE — SUT ETHILON 3-0 PS-1 18 IN 1663G

## (undated) DEVICE — ARTHROSCOPY FLOOR MAT

## (undated) DEVICE — NEEDLE SUT SCORPION MEGALOADER

## (undated) DEVICE — TUBING ARTHROSCOPY REDUCE PATIENT

## (undated) DEVICE — KIT DISP FIBERTAK CURVED SPEAR

## (undated) DEVICE — CHLORAPREP HI-LITE 26ML ORANGE

## (undated) DEVICE — CANNULA ARTHRO LOPRFL 5MM X 7CM

## (undated) DEVICE — LOOP SUT W/PASSER 25DEG CRV RT

## (undated) DEVICE — SYRINGE 10ML LL

## (undated) DEVICE — CANNULA 7 X70MM THRD SEAL SIDE PORT

## (undated) DEVICE — DISPOSABLE EQUIPMENT COVER: Brand: SMALL TOWEL DRAPE

## (undated) DEVICE — IMPERVIOUS STOCKINETTE: Brand: DEROYAL

## (undated) DEVICE — INTENDED FOR TISSUE SEPARATION, AND OTHER PROCEDURES THAT REQUIRE A SHARP SURGICAL BLADE TO PUNCTURE OR CUT.: Brand: BARD-PARKER ® CARBON RIB-BACK BLADES

## (undated) DEVICE — POSITIONER TRIMANO LIMB BEACH CHAIR

## (undated) DEVICE — Device

## (undated) DEVICE — PROBE ABLATION  APOLLO RF ASPIRING 90 DEG

## (undated) DEVICE — VULCAN SAPHYRE II ABLATION PROBE                                    WITH SUCTION, 90 DEGREE, PEWTER SHAFT: Brand: VULCAN SAPHYRE

## (undated) DEVICE — GLOVE SRG BIOGEL 8

## (undated) DEVICE — FINGERSHIELD 2/PK

## (undated) DEVICE — DRESSING MEPILEX AG BORDER POST-OP 4 X 6 IN

## (undated) DEVICE — GLOVE INDICATOR PI UNDERGLOVE SZ 8 BLUE

## (undated) DEVICE — ASTOUND STANDARD SURGICAL GOWN, XL: Brand: CONVERTORS

## (undated) DEVICE — PACK PBDS SHOULDER ARTHROSCOPY RF

## (undated) DEVICE — 10K ARTHROSCOPY INFLOW/OUTFLOW TUBE SET: Brand: 10K